# Patient Record
Sex: MALE | Race: WHITE | NOT HISPANIC OR LATINO | Employment: OTHER | URBAN - NONMETROPOLITAN AREA
[De-identification: names, ages, dates, MRNs, and addresses within clinical notes are randomized per-mention and may not be internally consistent; named-entity substitution may affect disease eponyms.]

---

## 2017-01-03 ENCOUNTER — APPOINTMENT (OUTPATIENT)
Dept: CARDIAC REHAB | Facility: HOSPITAL | Age: 67
End: 2017-01-03

## 2017-01-04 ENCOUNTER — APPOINTMENT (OUTPATIENT)
Dept: CARDIAC REHAB | Facility: HOSPITAL | Age: 67
End: 2017-01-04

## 2017-01-06 ENCOUNTER — APPOINTMENT (OUTPATIENT)
Dept: CARDIAC REHAB | Facility: HOSPITAL | Age: 67
End: 2017-01-06

## 2017-01-06 ENCOUNTER — TREATMENT (OUTPATIENT)
Dept: CARDIAC REHAB | Facility: HOSPITAL | Age: 67
End: 2017-01-06

## 2017-01-06 VITALS — OXYGEN SATURATION: 98 % | SYSTOLIC BLOOD PRESSURE: 110 MMHG | HEART RATE: 90 BPM | DIASTOLIC BLOOD PRESSURE: 70 MMHG

## 2017-01-06 DIAGNOSIS — Z95.1 POSTSURGICAL AORTOCORONARY BYPASS STATUS: Primary | ICD-10-CM

## 2017-01-06 DIAGNOSIS — I21.4 NSTEMI (NON-ST ELEVATED MYOCARDIAL INFARCTION) (HCC): ICD-10-CM

## 2017-01-06 PROCEDURE — 93798 PHYS/QHP OP CAR RHAB W/ECG: CPT

## 2017-01-06 NOTE — PROGRESS NOTES
Pt attended phase II visit.  Tolerated exercise well, NAD noted, no c/o's voiced, skin w/p/d, resp nl and even, denies chest discomfort,see Romie documentation for exercise data.  Dr Gomez physician immediately available.  V/S WIDL

## 2017-01-09 ENCOUNTER — TREATMENT (OUTPATIENT)
Dept: CARDIAC REHAB | Facility: HOSPITAL | Age: 67
End: 2017-01-09

## 2017-01-09 ENCOUNTER — APPOINTMENT (OUTPATIENT)
Dept: CARDIAC REHAB | Facility: HOSPITAL | Age: 67
End: 2017-01-09

## 2017-01-09 VITALS — SYSTOLIC BLOOD PRESSURE: 128 MMHG | DIASTOLIC BLOOD PRESSURE: 80 MMHG | HEART RATE: 94 BPM

## 2017-01-09 DIAGNOSIS — Z95.1 POSTSURGICAL AORTOCORONARY BYPASS STATUS: Primary | ICD-10-CM

## 2017-01-09 DIAGNOSIS — I21.4 NSTEMI (NON-ST ELEVATED MYOCARDIAL INFARCTION) (HCC): ICD-10-CM

## 2017-01-09 PROCEDURE — 93798 PHYS/QHP OP CAR RHAB W/ECG: CPT

## 2017-01-11 ENCOUNTER — TREATMENT (OUTPATIENT)
Dept: CARDIAC REHAB | Facility: HOSPITAL | Age: 67
End: 2017-01-11

## 2017-01-11 ENCOUNTER — APPOINTMENT (OUTPATIENT)
Dept: CARDIAC REHAB | Facility: HOSPITAL | Age: 67
End: 2017-01-11

## 2017-01-11 VITALS — DIASTOLIC BLOOD PRESSURE: 78 MMHG | HEART RATE: 92 BPM | SYSTOLIC BLOOD PRESSURE: 104 MMHG

## 2017-01-11 DIAGNOSIS — I21.4 NSTEMI (NON-ST ELEVATED MYOCARDIAL INFARCTION) (HCC): Primary | ICD-10-CM

## 2017-01-11 DIAGNOSIS — Z95.1 POSTSURGICAL AORTOCORONARY BYPASS STATUS: ICD-10-CM

## 2017-01-11 PROCEDURE — 93798 PHYS/QHP OP CAR RHAB W/ECG: CPT

## 2017-01-13 ENCOUNTER — TREATMENT (OUTPATIENT)
Dept: CARDIAC REHAB | Facility: HOSPITAL | Age: 67
End: 2017-01-13

## 2017-01-13 ENCOUNTER — APPOINTMENT (OUTPATIENT)
Dept: CARDIAC REHAB | Facility: HOSPITAL | Age: 67
End: 2017-01-13

## 2017-01-13 VITALS — SYSTOLIC BLOOD PRESSURE: 120 MMHG | DIASTOLIC BLOOD PRESSURE: 74 MMHG | HEART RATE: 96 BPM

## 2017-01-13 DIAGNOSIS — I21.4 NSTEMI (NON-ST ELEVATED MYOCARDIAL INFARCTION) (HCC): ICD-10-CM

## 2017-01-13 DIAGNOSIS — Z95.1 POSTSURGICAL AORTOCORONARY BYPASS STATUS: Primary | ICD-10-CM

## 2017-01-13 PROCEDURE — 93798 PHYS/QHP OP CAR RHAB W/ECG: CPT

## 2017-01-13 NOTE — PROGRESS NOTES
Pt attended phase II visit.  Tolerated exercise well, NAD noted, no c/o's voiced, skin w/p/d, resp nl and even, denies chest discomfort,see Romie documentation for exercise data.  Dr Read physician immediately available.  V/S WIDL

## 2017-01-16 ENCOUNTER — APPOINTMENT (OUTPATIENT)
Dept: CARDIAC REHAB | Facility: HOSPITAL | Age: 67
End: 2017-01-16

## 2017-01-16 ENCOUNTER — TREATMENT (OUTPATIENT)
Dept: CARDIAC REHAB | Facility: HOSPITAL | Age: 67
End: 2017-01-16

## 2017-01-16 VITALS — DIASTOLIC BLOOD PRESSURE: 86 MMHG | HEART RATE: 86 BPM | SYSTOLIC BLOOD PRESSURE: 124 MMHG

## 2017-01-16 DIAGNOSIS — I21.4 NSTEMI (NON-ST ELEVATED MYOCARDIAL INFARCTION) (HCC): Primary | ICD-10-CM

## 2017-01-16 DIAGNOSIS — Z95.1 POSTSURGICAL AORTOCORONARY BYPASS STATUS: ICD-10-CM

## 2017-01-16 PROCEDURE — 93798 PHYS/QHP OP CAR RHAB W/ECG: CPT

## 2017-01-18 ENCOUNTER — TREATMENT (OUTPATIENT)
Dept: CARDIAC REHAB | Facility: HOSPITAL | Age: 67
End: 2017-01-18

## 2017-01-18 ENCOUNTER — APPOINTMENT (OUTPATIENT)
Dept: CARDIAC REHAB | Facility: HOSPITAL | Age: 67
End: 2017-01-18

## 2017-01-18 VITALS — SYSTOLIC BLOOD PRESSURE: 130 MMHG | OXYGEN SATURATION: 98 % | HEART RATE: 79 BPM | DIASTOLIC BLOOD PRESSURE: 84 MMHG

## 2017-01-18 DIAGNOSIS — I21.4 NSTEMI (NON-ST ELEVATED MYOCARDIAL INFARCTION) (HCC): Primary | ICD-10-CM

## 2017-01-18 DIAGNOSIS — Z95.1 POSTSURGICAL AORTOCORONARY BYPASS STATUS: ICD-10-CM

## 2017-01-18 PROCEDURE — 93798 PHYS/QHP OP CAR RHAB W/ECG: CPT

## 2017-01-20 ENCOUNTER — APPOINTMENT (OUTPATIENT)
Dept: CARDIAC REHAB | Facility: HOSPITAL | Age: 67
End: 2017-01-20

## 2017-01-23 ENCOUNTER — TREATMENT (OUTPATIENT)
Dept: CARDIAC REHAB | Facility: HOSPITAL | Age: 67
End: 2017-01-23

## 2017-01-23 ENCOUNTER — APPOINTMENT (OUTPATIENT)
Dept: CARDIAC REHAB | Facility: HOSPITAL | Age: 67
End: 2017-01-23

## 2017-01-23 VITALS — SYSTOLIC BLOOD PRESSURE: 126 MMHG | DIASTOLIC BLOOD PRESSURE: 86 MMHG | HEART RATE: 80 BPM

## 2017-01-23 DIAGNOSIS — I21.4 NSTEMI (NON-ST ELEVATED MYOCARDIAL INFARCTION) (HCC): Primary | ICD-10-CM

## 2017-01-23 DIAGNOSIS — Z95.1 POSTSURGICAL AORTOCORONARY BYPASS STATUS: ICD-10-CM

## 2017-01-23 PROCEDURE — 93798 PHYS/QHP OP CAR RHAB W/ECG: CPT

## 2017-01-25 ENCOUNTER — TREATMENT (OUTPATIENT)
Dept: CARDIAC REHAB | Facility: HOSPITAL | Age: 67
End: 2017-01-25

## 2017-01-25 ENCOUNTER — APPOINTMENT (OUTPATIENT)
Dept: CARDIAC REHAB | Facility: HOSPITAL | Age: 67
End: 2017-01-25

## 2017-01-25 VITALS — SYSTOLIC BLOOD PRESSURE: 128 MMHG | DIASTOLIC BLOOD PRESSURE: 80 MMHG | HEART RATE: 76 BPM | OXYGEN SATURATION: 98 %

## 2017-01-25 DIAGNOSIS — I21.4 NSTEMI (NON-ST ELEVATED MYOCARDIAL INFARCTION) (HCC): Primary | ICD-10-CM

## 2017-01-25 DIAGNOSIS — Z95.1 POSTSURGICAL AORTOCORONARY BYPASS STATUS: ICD-10-CM

## 2017-01-25 PROCEDURE — 93798 PHYS/QHP OP CAR RHAB W/ECG: CPT

## 2017-01-27 ENCOUNTER — APPOINTMENT (OUTPATIENT)
Dept: CARDIAC REHAB | Facility: HOSPITAL | Age: 67
End: 2017-01-27

## 2017-01-30 ENCOUNTER — APPOINTMENT (OUTPATIENT)
Dept: CARDIAC REHAB | Facility: HOSPITAL | Age: 67
End: 2017-01-30

## 2017-02-01 ENCOUNTER — APPOINTMENT (OUTPATIENT)
Dept: CARDIAC REHAB | Facility: HOSPITAL | Age: 67
End: 2017-02-01

## 2017-02-03 ENCOUNTER — APPOINTMENT (OUTPATIENT)
Dept: CARDIAC REHAB | Facility: HOSPITAL | Age: 67
End: 2017-02-03

## 2017-02-06 ENCOUNTER — APPOINTMENT (OUTPATIENT)
Dept: CARDIAC REHAB | Facility: HOSPITAL | Age: 67
End: 2017-02-06

## 2017-02-08 ENCOUNTER — APPOINTMENT (OUTPATIENT)
Dept: CARDIAC REHAB | Facility: HOSPITAL | Age: 67
End: 2017-02-08

## 2017-02-10 ENCOUNTER — APPOINTMENT (OUTPATIENT)
Dept: CARDIAC REHAB | Facility: HOSPITAL | Age: 67
End: 2017-02-10

## 2017-02-13 ENCOUNTER — APPOINTMENT (OUTPATIENT)
Dept: CARDIAC REHAB | Facility: HOSPITAL | Age: 67
End: 2017-02-13

## 2017-02-15 ENCOUNTER — APPOINTMENT (OUTPATIENT)
Dept: CARDIAC REHAB | Facility: HOSPITAL | Age: 67
End: 2017-02-15

## 2017-02-17 ENCOUNTER — APPOINTMENT (OUTPATIENT)
Dept: CARDIAC REHAB | Facility: HOSPITAL | Age: 67
End: 2017-02-17

## 2017-02-20 ENCOUNTER — APPOINTMENT (OUTPATIENT)
Dept: CARDIAC REHAB | Facility: HOSPITAL | Age: 67
End: 2017-02-20

## 2017-02-23 ENCOUNTER — OFFICE VISIT (OUTPATIENT)
Dept: CARDIOLOGY | Facility: CLINIC | Age: 67
End: 2017-02-23

## 2017-02-23 VITALS
SYSTOLIC BLOOD PRESSURE: 123 MMHG | OXYGEN SATURATION: 95 % | BODY MASS INDEX: 28.45 KG/M2 | DIASTOLIC BLOOD PRESSURE: 78 MMHG | HEART RATE: 93 BPM | HEIGHT: 71 IN | WEIGHT: 203.2 LBS

## 2017-02-23 DIAGNOSIS — E78.5 HYPERLIPIDEMIA LDL GOAL <70: ICD-10-CM

## 2017-02-23 DIAGNOSIS — Z72.0 TOBACCO ABUSE: ICD-10-CM

## 2017-02-23 DIAGNOSIS — I48.0 PAROXYSMAL ATRIAL FIBRILLATION (HCC): ICD-10-CM

## 2017-02-23 DIAGNOSIS — I25.119 CORONARY ARTERY DISEASE INVOLVING NATIVE CORONARY ARTERY OF NATIVE HEART WITH ANGINA PECTORIS (HCC): Primary | ICD-10-CM

## 2017-02-23 DIAGNOSIS — G45.8 OTHER SPECIFIED TRANSIENT CEREBRAL ISCHEMIAS: ICD-10-CM

## 2017-02-23 PROCEDURE — 99213 OFFICE O/P EST LOW 20 MIN: CPT | Performed by: INTERNAL MEDICINE

## 2017-02-23 NOTE — PROGRESS NOTES
Subjective   NAME:    Jose Nesbitt   :      1950  DATE:    2017    Jose Nesbitt is a 66-year-old  male who has had bypass surgery.  He is here for 3 month follow-up appointment.    He denies any chest discomfort, shortness of breath, lightheadedness or dizziness.  He has decreased his medication intake to the following.  Aspirin 81 mg daily, atorvastatin 20 mg daily, Plavix 75 mg daily, metoprolol 25 mg twice a day.    He admits to continuing to smoke a pack of cigarettes a day.  However he states that he is ready to stop.  He has patches at home which she intends to use for this effort.    REASON FOR VISIT:  Chief Complaint   Patient presents with   • Follow-up   • Atrial Fibrillation       HISTORY:  PAST MEDICAL HISTORY:   Past Medical History   Diagnosis Date   • Coronary artery disease    • Hyperlipidemia    • Hypertension    • Neck discomfort      RELATED TO FALL       SURGICAL HISTORY:   Past Surgical History   Procedure Laterality Date   • Cyst removal       FROM CHEST, APPROX 20 YEARS AGO   • Hernia repair       APPROX 30 YEARS AGO   • Cardiac catheterization N/A 10/11/2016     Procedure: Left Heart Cath;  Surgeon: Travon Ramey DO;  Location: [facility] CATH INVASIVE LOCATION;  Service:    • Coronary artery bypass graft N/A 10/12/2016     Procedure: ROSALINDA PER ANESTHESIA, MEDIAN STERNOTOMY, CORONARY ARTERY BYPASS GRAFT X  3, UTILIZING THE LEFT INTERNAL MAMMARY ARTERY AND EVH OF RIGHT GREATER SAPHENOUS VEIN;  Surgeon: Joe Hogue MD;  Location: [facility] OR;  Service:        SOCIAL HISTORY:   Social History     Social History   • Marital status:      Spouse name: N/A   • Number of children: 2   • Years of education: N/A     Occupational History   • [employer] company employee      Social History Main Topics   • Smoking status: Current Every Day Smoker     Packs/day: 1.00     Years: 40.00     Types: Cigarettes     Last attempt to quit:    • Smokeless tobacco: Never  Used      Comment: Currently 3-4 Cigarettes Daily   • Alcohol use 1.2 oz/week     2 Cans of beer per week      Comment: occasional   • Drug use: No   • Sexual activity: Defer     Other Topics Concern   • None     Social History Narrative        Lives with a girlfriend    Works as a     Drinks alcohol occasionally    Smokes approximately one pack cigarettes per day.  Has recently stopped smoking.    Initially used Chantix but unable to continue due to his work    Drinks 1 -2 servings of caffeine per day.           FAMILY HISTORY:   Family History   Problem Relation Age of Onset   • Diabetes Mother    • Pneumonia Father    • Heart attack Sister 67       REVIEW OF SYSTEMS:  Review of Systems   Constitutional: Negative for activity change, appetite change and fatigue.   HENT: Positive for tinnitus. Negative for congestion.    Eyes: Negative for visual disturbance.   Respiratory: Negative for cough, chest tightness and shortness of breath.    Cardiovascular: Negative for chest pain, palpitations and leg swelling.   Gastrointestinal: Negative for blood in stool, nausea and vomiting.   Endocrine: Negative for cold intolerance, heat intolerance and polyuria.   Genitourinary: Negative for dysuria.   Musculoskeletal: Positive for myalgias and neck pain.   Skin: Negative for rash.   Neurological: Positive for weakness. Negative for dizziness, syncope and light-headedness.   Hematological: Bruises/bleeds easily.   Psychiatric/Behavioral: Negative for confusion and sleep disturbance.       Objective       PHYSICAL EXAMINATION:  Physical Exam   Constitutional: He is oriented to person, place, and time. He appears well-developed and well-nourished.   HENT:   Head: Normocephalic and atraumatic.   Eyes: Conjunctivae and EOM are normal. Pupils are equal, round, and reactive to light.   Neck: Normal range of motion. Neck supple. No JVD present. No tracheal deviation present. No thyromegaly present.   Cardiovascular:  "Normal rate, regular rhythm, normal heart sounds and intact distal pulses.  Exam reveals no gallop and no friction rub.    No murmur heard.  Pulmonary/Chest: Effort normal and breath sounds normal. No respiratory distress. He has no wheezes. He has no rales. He exhibits no tenderness.   Abdominal: Soft. Bowel sounds are normal. He exhibits no distension and no mass. There is no tenderness. No hernia.   Musculoskeletal: Normal range of motion. He exhibits no edema, tenderness or deformity.   Lymphadenopathy:     He has no cervical adenopathy.   Neurological: He is alert and oriented to person, place, and time. He has normal reflexes. He displays normal reflexes. No cranial nerve deficit. He exhibits normal muscle tone. Coordination normal.   Skin: Skin is warm and dry.   Psychiatric: He has a normal mood and affect. His behavior is normal. Judgment and thought content normal.       VITAL SIGNS:   Visit Vitals   • /78 (BP Location: Right arm, Patient Position: Sitting)   • Pulse 93   • Ht 71\" (180.3 cm)   • Wt 203 lb 3.2 oz (92.2 kg)   • SpO2 95%   • BMI 28.34 kg/m2       Procedure   Procedures         Assessment/Plan     Problems Addressed this Visit        Cardiovascular and Mediastinum    Coronary artery disease involving native coronary artery of native heart with angina pectoris - Primary    H/O TIA (transient ischemic attack) (in 2002)    Hyperlipidemia LDL goal <70    Paroxysmal atrial fibrillation       Other    Tobacco abuse       1.  Continue medications as currently being taken.  2.  Counseled again on tobacco cessation.  3.  We'll see him back in clinic in 6 months.         Return in about 6 months (around 8/23/2017).    Current Outpatient Prescriptions:   •  Acetaminophen (TYLENOL PO), Take  by mouth As Needed., Disp: , Rfl:   •  aspirin 81 MG EC tablet, Take 81 mg by mouth Daily., Disp: , Rfl:   •  atorvastatin (LIPITOR) 40 MG tablet, Take 1 tablet by mouth Every Night. (Patient taking differently: " Take 20 mg by mouth Every Night.), Disp: 30 tablet, Rfl: 11  •  clopidogrel (PLAVIX) 75 MG tablet, Take 1 tablet by mouth Daily., Disp: 30 tablet, Rfl: 12  •  esomeprazole (NexIUM) 40 MG capsule, Take 40 mg by mouth Every Morning Before Breakfast., Disp: , Rfl:   •  indomethacin (INDOCIN) 25 MG capsule, Take 1 capsule by mouth 3 (Three) Times a Day As Needed for mild pain (1-3)., Disp: 90 capsule, Rfl: 2  •  metoprolol tartrate (LOPRESSOR) 25 MG tablet, Take 0.5 tablets by mouth 2 (Two) Times a Day. (Patient taking differently: Take 12.5 mg by mouth 2 (Two) Times a Day. 1/2 tablet Daily), Disp: 30 tablet, Rfl: 3  •  tamsulosin (FLOMAX) 0.4 MG capsule 24 hr capsule, Take 1 capsule by mouth Every Night., Disp: , Rfl:                  Travon Ramey DO, KC, FACC, FACOI, FCCP

## 2017-03-31 ENCOUNTER — TELEPHONE (OUTPATIENT)
Dept: CARDIOLOGY | Facility: CLINIC | Age: 67
End: 2017-03-31

## 2017-03-31 NOTE — TELEPHONE ENCOUNTER
PT CALLED REQUESTING WE ORDER A STRESS TEST ON HIM, STATED HE NEEDED ONE IN ORDER TO BE CLEARED TO GO BACK TO WORK.     I DIDN'T SEE ANYTHING IN YOUR NOTE ABOUT ONE.     A

## 2017-04-05 ENCOUNTER — TRANSCRIBE ORDERS (OUTPATIENT)
Dept: ADMINISTRATIVE | Facility: HOSPITAL | Age: 67
End: 2017-04-05

## 2017-04-05 DIAGNOSIS — R06.02 SHORTNESS OF BREATH: Primary | ICD-10-CM

## 2017-04-06 ENCOUNTER — HOSPITAL ENCOUNTER (OUTPATIENT)
Dept: NUCLEAR MEDICINE | Facility: HOSPITAL | Age: 67
Discharge: HOME | End: 2017-04-06
Attending: INTERNAL MEDICINE

## 2017-04-06 ENCOUNTER — HOSPITAL ENCOUNTER (OUTPATIENT)
Dept: CARDIOLOGY | Facility: HOSPITAL | Age: 67
Discharge: HOME | End: 2017-04-06
Attending: INTERNAL MEDICINE

## 2017-04-06 VITALS — BODY MASS INDEX: 29.12 KG/M2 | HEIGHT: 71 IN | WEIGHT: 208 LBS

## 2017-04-06 DIAGNOSIS — R06.02 SHORTNESS OF BREATH: ICD-10-CM

## 2017-04-06 PROCEDURE — 93018 CV STRESS TEST I&R ONLY: CPT | Performed by: INTERNAL MEDICINE

## 2017-04-06 PROCEDURE — A9500 TC99M SESTAMIBI: HCPCS | Performed by: INTERNAL MEDICINE

## 2017-04-06 PROCEDURE — 0 TECHNETIUM SESTAMIBI: Performed by: INTERNAL MEDICINE

## 2017-04-06 PROCEDURE — 78451 HT MUSCLE IMAGE SPECT SING: CPT | Performed by: INTERNAL MEDICINE

## 2017-04-06 PROCEDURE — 93017 CV STRESS TEST TRACING ONLY: CPT

## 2017-04-06 PROCEDURE — 78451 HT MUSCLE IMAGE SPECT SING: CPT

## 2017-04-06 RX ADMIN — Medication 1 DOSE: at 08:43

## 2017-04-06 RX ADMIN — Medication 1 DOSE: at 07:20

## 2017-04-07 LAB
BH CV NUCLEAR PRIOR STUDY: 3
BH CV STRESS BP STAGE 1: NORMAL
BH CV STRESS BP STAGE 2: NORMAL
BH CV STRESS BP STAGE 3: NORMAL
BH CV STRESS DURATION MIN STAGE 1: 3
BH CV STRESS DURATION MIN STAGE 2: 3
BH CV STRESS DURATION MIN STAGE 3: 2
BH CV STRESS DURATION SEC STAGE 1: 0
BH CV STRESS DURATION SEC STAGE 2: 0
BH CV STRESS DURATION SEC STAGE 3: 32
BH CV STRESS GRADE STAGE 1: 10
BH CV STRESS GRADE STAGE 2: 12
BH CV STRESS GRADE STAGE 3: 14
BH CV STRESS HR STAGE 1: 96
BH CV STRESS HR STAGE 2: 111
BH CV STRESS HR STAGE 3: 133
BH CV STRESS METS STAGE 1: 5
BH CV STRESS METS STAGE 2: 7.5
BH CV STRESS METS STAGE 3: 10
BH CV STRESS PROTOCOL 1: NORMAL
BH CV STRESS RECOVERY BP: NORMAL MMHG
BH CV STRESS RECOVERY HR: 91 BPM
BH CV STRESS SPEED STAGE 1: 1.7
BH CV STRESS SPEED STAGE 2: 2.5
BH CV STRESS SPEED STAGE 3: 3.4
BH CV STRESS STAGE 1: 1
BH CV STRESS STAGE 2: 2
BH CV STRESS STAGE 3: 3
LV EF NUC BP: 67 %
MAXIMAL PREDICTED HEART RATE: 156 BPM
PERCENT MAX PREDICTED HR: 85.26 %
STRESS BASELINE BP: NORMAL MMHG
STRESS BASELINE HR: 74 BPM
STRESS PERCENT HR: 100 %
STRESS POST ESTIMATED WORKLOAD: 10.1 METS
STRESS POST EXERCISE DUR MIN: 8 MIN
STRESS POST EXERCISE DUR SEC: 32 SEC
STRESS POST PEAK BP: NORMAL MMHG
STRESS POST PEAK HR: 133 BPM
STRESS TARGET HR: 133 BPM

## 2017-10-28 ENCOUNTER — HOSPITAL ENCOUNTER (EMERGENCY)
Facility: HOSPITAL | Age: 67
Discharge: HOME | End: 2017-10-28
Attending: EMERGENCY MEDICINE | Admitting: EMERGENCY MEDICINE

## 2017-10-28 VITALS
SYSTOLIC BLOOD PRESSURE: 120 MMHG | TEMPERATURE: 98.2 F | HEART RATE: 82 BPM | OXYGEN SATURATION: 97 % | DIASTOLIC BLOOD PRESSURE: 74 MMHG | HEIGHT: 71 IN | RESPIRATION RATE: 18 BRPM | BODY MASS INDEX: 28.84 KG/M2 | WEIGHT: 206 LBS

## 2017-10-28 DIAGNOSIS — S00.81XA ABRASION OF FACE, INITIAL ENCOUNTER: Primary | ICD-10-CM

## 2017-10-28 DIAGNOSIS — R58 BLEEDING: ICD-10-CM

## 2017-10-28 PROCEDURE — 99283 EMERGENCY DEPT VISIT LOW MDM: CPT

## 2017-10-28 RX ADMIN — SILVER NITRATE APPLICATORS 1 APPLICATION: 25; 75 STICK TOPICAL at 01:42

## 2017-11-22 ENCOUNTER — TELEPHONE (OUTPATIENT)
Dept: CARDIOLOGY | Facility: CLINIC | Age: 67
End: 2017-11-22

## 2017-11-22 NOTE — TELEPHONE ENCOUNTER
WE RECEIVED REQUEST FOR MEDS FROM PHARMACY HE NEEDED TO BE SEEN IN OFFICE AROUND AUGUST     LEFT MESSAGE FOR PATIENT TO CALL BACK [facility]

## 2017-11-22 NOTE — TELEPHONE ENCOUNTER
SPOKE WITH PATIENT'S WIFE (AND PATIENT WAS IN BACKGROUND) I TOLD THEM THAT WE RECEIVED A REFILL REQUEST FROM PHARMACY WANTING TO HAVE HIS ATORVASTATIN REFILLED. I TOLD HER THAT SINCE KVNG HAD NOT BEEN SEEN IN OUR OFFICE IN SO LONG THAT WE WILL NEED TO GET HIM AN APPOINTMENT. PATIENT STATES THAT HE WOULD RATHER SEE DR. BECKWITH AND HAVE DR. BECKWITH PRESCRIBE THIS MEDICATION AND TO SEE IF HE REALLY NEEDS A CARDIOLOGIST. PATIENTS WIFE STATES THAT THEY WILL CALL US BACK AFTER HIS APPT WITH HIS PCP IF HE REALLY NEEDS AN APPT OR NOT.     I WILL INFORM PHARMACY TO SEND THIS TO HIS PCP TO GET REFILLS.

## 2017-12-19 ENCOUNTER — TRANSCRIBE ORDERS (OUTPATIENT)
Dept: PULMONOLOGY | Facility: HOSPITAL | Age: 67
End: 2017-12-19

## 2017-12-19 ENCOUNTER — HOSPITAL ENCOUNTER (OUTPATIENT)
Dept: RESPIRATORY THERAPY | Facility: HOSPITAL | Age: 67
Discharge: HOME | End: 2017-12-19
Attending: INTERNAL MEDICINE | Admitting: INTERNAL MEDICINE

## 2017-12-19 DIAGNOSIS — R00.2 PALPITATIONS: Primary | ICD-10-CM

## 2017-12-19 DIAGNOSIS — R00.2 PALPITATIONS: ICD-10-CM

## 2017-12-19 PROCEDURE — 93227 XTRNL ECG REC<48 HR R&I: CPT | Performed by: INTERNAL MEDICINE

## 2017-12-19 PROCEDURE — 93225 XTRNL ECG REC<48 HRS REC: CPT

## 2017-12-19 PROCEDURE — 93226 XTRNL ECG REC<48 HR SCAN A/R: CPT

## 2018-01-10 ENCOUNTER — OFFICE VISIT (OUTPATIENT)
Dept: CARDIOLOGY | Facility: CLINIC | Age: 68
End: 2018-01-10

## 2018-01-10 VITALS
DIASTOLIC BLOOD PRESSURE: 82 MMHG | HEIGHT: 71 IN | SYSTOLIC BLOOD PRESSURE: 122 MMHG | BODY MASS INDEX: 28.39 KG/M2 | WEIGHT: 202.8 LBS | HEART RATE: 85 BPM

## 2018-01-10 DIAGNOSIS — I25.119 CORONARY ARTERY DISEASE INVOLVING NATIVE CORONARY ARTERY OF NATIVE HEART WITH ANGINA PECTORIS (HCC): Primary | ICD-10-CM

## 2018-01-10 DIAGNOSIS — Z72.0 TOBACCO ABUSE: ICD-10-CM

## 2018-01-10 DIAGNOSIS — E78.5 HYPERLIPIDEMIA LDL GOAL <70: ICD-10-CM

## 2018-01-10 DIAGNOSIS — R01.1 SYSTOLIC MURMUR: ICD-10-CM

## 2018-01-10 PROBLEM — R07.89 ATYPICAL CHEST PAIN: Status: RESOLVED | Noted: 2018-01-10 | Resolved: 2018-01-10

## 2018-01-10 PROBLEM — R07.89 ATYPICAL CHEST PAIN: Status: ACTIVE | Noted: 2018-01-10

## 2018-01-10 PROBLEM — I49.3 PVC'S (PREMATURE VENTRICULAR CONTRACTIONS): Status: ACTIVE | Noted: 2018-01-10

## 2018-01-10 PROCEDURE — 93000 ELECTROCARDIOGRAM COMPLETE: CPT | Performed by: INTERNAL MEDICINE

## 2018-01-10 PROCEDURE — 99204 OFFICE O/P NEW MOD 45 MIN: CPT | Performed by: INTERNAL MEDICINE

## 2018-01-10 RX ORDER — METOPROLOL SUCCINATE 25 MG/1
25 TABLET, EXTENDED RELEASE ORAL DAILY
COMMUNITY
End: 2018-07-11 | Stop reason: SDUPTHER

## 2018-01-10 RX ORDER — ROSUVASTATIN CALCIUM 20 MG/1
20 TABLET, COATED ORAL DAILY
Qty: 90 TABLET | Refills: 3 | Status: SHIPPED | OUTPATIENT
Start: 2018-01-10 | End: 2018-07-11 | Stop reason: SDUPTHER

## 2018-01-10 RX ORDER — ASPIRIN 81 MG/1
81 TABLET ORAL DAILY
Qty: 90 TABLET | Refills: 3
Start: 2018-01-10 | End: 2018-07-11 | Stop reason: SDUPTHER

## 2018-01-10 NOTE — ASSESSMENT & PLAN NOTE
"We discussed tobacco cessation at today's visit. The patient is presently not \"mentally\" ready for complete tobacco cessation.  "

## 2018-01-10 NOTE — PROGRESS NOTES
"Encounter Date:01/10/2018    Patient ID: Jose Nesbitt is a 67 y.o. male who resides in [place], [place].    CC/Reason for visit:  Coronary Artery Disease (Consult)            Jose Nesbitt is referred to my office today by Padmaja Christine to establish care of his coronary artery disease and cardiac risk factors. The patient is a 67-year-old gentleman who in October 2016 suffered a NSTEMI.  He underwent cardiac catheterization by Dr. Travon Ramey at [facility] and was found to have an ulcerated plaque of the left main and multivessel CAD. He underwent urgent coronary bypass surgery with Dr. Maynor Hogue which was uneventful. LV systolic function remained preserved.    Since his bypass, the patient is been physically active without symptoms of angina or heart failure. He has been compliant with his medications. He previously smoked 3 packs a day, and has reduced his smoking to one pack a day but is not \"ready\" to quit yet. He does not want to take nicotine replacement therapy or Chantix at this time.    In December, the patient underwent a Holter monitor for what was perceived to be abnormal heartbeats on physical examination with his PCP. Monitor was unremarkable with no significant arrhythmia or ectopy burden. The patient denies palpitations.      Review of Systems   Constitution: Negative for weakness and malaise/fatigue.   Eyes: Negative for vision loss in left eye and vision loss in right eye.   Cardiovascular: Positive for leg swelling (right leg). Negative for chest pain, dyspnea on exertion, near-syncope, orthopnea, palpitations, paroxysmal nocturnal dyspnea and syncope.   Respiratory: Positive for shortness of breath.    Musculoskeletal: Negative for myalgias.   Neurological: Negative for brief paralysis, excessive daytime sleepiness, focal weakness, numbness and paresthesias.   All other systems reviewed and are negative.      The patient's past medical, social, family history and ROS reviewed " "in the patient's electronic medical record.    Allergies  Review of patient's allergies indicates no known allergies.    Outpatient Prescriptions Marked as Taking for the 1/10/18 encounter (Office Visit) with Colt Sawyer IV, MD   Medication Sig Dispense Refill   • esomeprazole (NexIUM) 40 MG capsule Take 40 mg by mouth Every Morning Before Breakfast.     • metoprolol succinate XL (TOPROL-XL) 25 MG 24 hr tablet Take 25 mg by mouth Daily.     • [DISCONTINUED] atorvastatin (LIPITOR) 40 MG tablet Take 1 tablet by mouth Every Night. (Patient taking differently: Take 20 mg by mouth Every Night.) 30 tablet 11   • [DISCONTINUED] clopidogrel (PLAVIX) 75 MG tablet Take 1 tablet by mouth Daily. 30 tablet 12         Blood pressure 122/82, pulse 85, height 180.3 cm (71\"), weight 92 kg (202 lb 12.8 oz).  Body mass index is 28.28 kg/(m^2).    Physical Exam   Constitutional: He is oriented to person, place, and time. He appears well-developed and well-nourished.   HENT:   Head: Normocephalic and atraumatic.   Eyes: Pupils are equal, round, and reactive to light. No scleral icterus.   Neck: No JVD present. Carotid bruit is not present. No thyromegaly present.   Cardiovascular: Normal rate and regular rhythm.  Exam reveals no gallop.    Murmur heard.   Harsh midsystolic murmur is present  at the upper right sternal border The intensity increases with respiration.  Pulmonary/Chest: Effort normal and breath sounds normal.   Abdominal: Soft. He exhibits no distension. There is no hepatosplenomegaly.   Musculoskeletal: He exhibits no edema.   Neurological: He is alert and oriented to person, place, and time.   Skin: Skin is warm and dry.   Psychiatric: He has a normal mood and affect. His behavior is normal.       Data Review:     ECG 12 Lead  Date/Time: 1/10/2018 10:52 AM  Performed by: COLT SAWYER IV  Authorized by: COLT SAWYER IV   Rhythm: sinus rhythm  BPM: 85  Conduction: 1st degree  Clinical " impression: abnormal ECG          Lipids (8/15/17): Total cholesterol 179, HDL 30, triglycerides 108,        Problem List Items Addressed This Visit        Cardiovascular and Mediastinum    Coronary artery disease involving native coronary artery of native heart with angina pectoris - Primary    Overview     · Cardiac catheterization for NSTEMI by Travon Ramey (10/11/2016):  Ulcerated critical distal left main disease and multivessel CAD.  · CABG by Dr. Joe Hogue (10/12/2016):  LIMA to LAD, SVG to ramus, SVG to OM1  · Exercise nuclear stress (4/6/17): Exercised for 8.5 minutes. No ischemia/infarct. LVEF 67%.         Current Assessment & Plan     The patient is presently without anginal symptoms. He is now one year out from his non-STEMI and clopidogrel may be discontinued. I like him to take low-dose aspirin on a daily basis and continue beta blocker therapy.         Relevant Medications    metoprolol succinate XL (TOPROL-XL) 25 MG 24 hr tablet    aspirin 81 MG EC tablet    Other Relevant Orders    ECG 12 Lead    Hyperlipidemia LDL goal <70    Overview     · High intensity statin therapy indicated given the presence coronary disease         Current Assessment & Plan     The patient's LDL is presently above goal of less than 70 g/dL. The patient's had intolerance to higher doses of atorvastatin in the past. We will try the patient on Crestor 20 g daily at bedtime and have him repeat CMP and lipids in 6 weeks.         Relevant Medications    rosuvastatin (CRESTOR) 20 MG tablet    Other Relevant Orders    Comprehensive Metabolic Panel    Lipid Panel    Systolic murmur    Current Assessment & Plan     Systolic murmur heard on exam today is consistent with systolic anterior motion of the mitral valve. Per patient's report, this is been noted on previous exams and presently he has no symptoms of obstruction i.e. heart failure, angina, or syncope.            Other    Tobacco abuse    Current Assessment & Plan  "    We discussed tobacco cessation at today's visit. The patient is presently not \"mentally\" ready for complete tobacco cessation.             The patient is physically active without cardiac symptoms of angina or heart failure. He is now one year out from his myocardial infarction and I am recommending he discontinue clopidogrel and continue aspirin monotherapy. His recent lipid profile suggest less than optimal control of his LDL and therefore I am asking the patient to switch from atorvastatin to rosuvastatin. CMP and lipids should be obtained 6 weeks after making this transition.       · Discontinue clopidogrel  · Increase frequency of low-dose aspirin to daily dosing  · Discontinue atorvastatin  · Start rosuvastatin 20 mg daily at bedtime  · CMP and lipids in 6 weeks  Return in about 6 months (around 7/10/2018).      Colt Sawyer IV, MD  1/10/2018     Scribed for Colt Sawyer IV, MD by Emile Quevedo. 1/10/2018  11:45 AM    "

## 2018-01-10 NOTE — ASSESSMENT & PLAN NOTE
Systolic murmur heard on exam today is consistent with systolic anterior motion of the mitral valve. Per patient's report, this is been noted on previous exams and presently he has no symptoms of obstruction i.e. heart failure, angina, or syncope.

## 2018-01-10 NOTE — ASSESSMENT & PLAN NOTE
The patient's LDL is presently above goal of less than 70 g/dL. The patient's had intolerance to higher doses of atorvastatin in the past. We will try the patient on Crestor 20 g daily at bedtime and have him repeat CMP and lipids in 6 weeks.

## 2018-01-10 NOTE — ASSESSMENT & PLAN NOTE
The patient is presently without anginal symptoms. He is now one year out from his non-STEMI and clopidogrel may be discontinued. I like him to take low-dose aspirin on a daily basis and continue beta blocker therapy.

## 2018-03-01 ENCOUNTER — TELEPHONE (OUTPATIENT)
Dept: CARDIOLOGY | Facility: CLINIC | Age: 68
End: 2018-03-01

## 2018-03-01 NOTE — TELEPHONE ENCOUNTER
Attempted to call the patient to review lab results and possibly increase Crestor if he is able to tolerate. Will await return call.

## 2018-04-05 ENCOUNTER — TELEPHONE (OUTPATIENT)
Dept: CARDIOLOGY | Facility: CLINIC | Age: 68
End: 2018-04-05

## 2018-04-05 NOTE — TELEPHONE ENCOUNTER
Patient called c/o shortness of breath with exertion, fatigue and dizziness at times. /85, HR 90 range. He says that his HR has been running in the 90's at rest. Denies leg swelling or chest pain.     Last stress test 4/5/2017, which was normal. Carotid duplex 10/21/2016, normal. CABG 10/12/2016. 12/2017, holter monitor showed no arrhythmias.     Currently on metoprolol succinate 25 mg daily, ASA 81 mg daily, Crestor 20 mg daily.    Wasn't sure if you wanted an echo and/or med changes? Something else?

## 2018-07-11 ENCOUNTER — OFFICE VISIT (OUTPATIENT)
Dept: CARDIOLOGY | Facility: CLINIC | Age: 68
End: 2018-07-11

## 2018-07-11 VITALS
DIASTOLIC BLOOD PRESSURE: 82 MMHG | WEIGHT: 201.2 LBS | BODY MASS INDEX: 28.17 KG/M2 | HEIGHT: 71 IN | SYSTOLIC BLOOD PRESSURE: 122 MMHG | HEART RATE: 77 BPM

## 2018-07-11 DIAGNOSIS — F17.210 NICOTINE DEPENDENCE, CIGARETTES, UNCOMPLICATED: ICD-10-CM

## 2018-07-11 DIAGNOSIS — Z72.0 TOBACCO ABUSE: ICD-10-CM

## 2018-07-11 DIAGNOSIS — E78.5 HYPERLIPIDEMIA LDL GOAL <70: ICD-10-CM

## 2018-07-11 DIAGNOSIS — R01.1 SYSTOLIC MURMUR: ICD-10-CM

## 2018-07-11 DIAGNOSIS — I25.119 CORONARY ARTERY DISEASE INVOLVING NATIVE CORONARY ARTERY OF NATIVE HEART WITH ANGINA PECTORIS (HCC): Primary | ICD-10-CM

## 2018-07-11 DIAGNOSIS — I49.3 PVC'S (PREMATURE VENTRICULAR CONTRACTIONS): ICD-10-CM

## 2018-07-11 DIAGNOSIS — Z12.2 ENCOUNTER FOR SCREENING FOR LUNG CANCER: ICD-10-CM

## 2018-07-11 PROCEDURE — 99214 OFFICE O/P EST MOD 30 MIN: CPT | Performed by: INTERNAL MEDICINE

## 2018-07-11 RX ORDER — ASPIRIN 81 MG/1
81 TABLET ORAL DAILY
Qty: 90 TABLET | Refills: 3
Start: 2018-07-11 | End: 2019-07-06

## 2018-07-11 RX ORDER — DIAZEPAM 5 MG/1
5 TABLET ORAL AS NEEDED
COMMUNITY
End: 2021-08-06

## 2018-07-11 RX ORDER — ROSUVASTATIN CALCIUM 20 MG/1
20 TABLET, COATED ORAL DAILY
Qty: 90 TABLET | Refills: 3 | Status: SHIPPED | OUTPATIENT
Start: 2018-07-11 | End: 2019-07-19

## 2018-07-11 RX ORDER — METOPROLOL SUCCINATE 25 MG/1
25 TABLET, EXTENDED RELEASE ORAL DAILY
Qty: 90 TABLET | Refills: 3 | Status: SHIPPED | OUTPATIENT
Start: 2018-07-11 | End: 2019-07-06

## 2018-07-11 NOTE — ASSESSMENT & PLAN NOTE
· Stable CCS class II symptoms  · Continue aspirin, beta blocker, and high intensity statin therapy

## 2018-07-11 NOTE — ASSESSMENT & PLAN NOTE
· Stable murmur consistent with known systolic anterior motion of the mitral valve  · No symptoms of heart failure

## 2018-07-11 NOTE — PROGRESS NOTES
Encounter Date:07/11/2018    Patient ID: Jose Nesbitt is a 67 y.o. male who resides in [place], [place].    CC/Reason for visit:  Coronary Artery Disease and PAF            Jose Nesbitt returns to my office today in follow up of his coronary artery disease, palpitations, tobacco abuse, and hyperlipidemia. Since last visit, patient has been feeling well overall from a cardiovascular standpoint. He notes one episode of palpitations. He states that it fluttered for approximately 30 seconds then subsided. This episode occurred after he was working in the hot sun. He states that he has been building a deck this summer and did so with no limitations.    Review of Systems   Constitution: Negative for weakness and malaise/fatigue.   Eyes: Negative for vision loss in left eye and vision loss in right eye.   Cardiovascular: Positive for irregular heartbeat and leg swelling. Negative for chest pain, dyspnea on exertion, near-syncope, orthopnea, palpitations, paroxysmal nocturnal dyspnea and syncope.   Respiratory: Positive for shortness of breath and snoring.    Skin: Positive for skin cancer.   Musculoskeletal: Positive for arthritis, joint pain, joint swelling, muscle cramps and myalgias.   Neurological: Negative for brief paralysis, excessive daytime sleepiness, focal weakness, numbness and paresthesias.   All other systems reviewed and are negative.      The patient's past medical, social, family history and ROS reviewed in the patient's electronic medical record.    Allergies  Patient has no known allergies.    Outpatient Prescriptions Marked as Taking for the 7/11/18 encounter (Office Visit) with Colt Sawyer IV, MD   Medication Sig Dispense Refill   • aspirin 81 MG EC tablet Take 1 tablet by mouth Daily for 360 days. Takes every other day 90 tablet 3   • diazePAM (VALIUM) 5 MG tablet Take 5 mg by mouth As Needed for Anxiety.     • esomeprazole (NexIUM) 40 MG capsule Take 40 mg by mouth As Needed.     •  "metoprolol succinate XL (TOPROL-XL) 25 MG 24 hr tablet Take 1 tablet by mouth Daily for 360 days. 90 tablet 3   • rosuvastatin (CRESTOR) 20 MG tablet Take 1 tablet by mouth Daily. 90 tablet 3   • [DISCONTINUED] aspirin 81 MG EC tablet Take 1 tablet by mouth Daily for 360 days. Takes every other day 90 tablet 3   • [DISCONTINUED] metoprolol succinate XL (TOPROL-XL) 25 MG 24 hr tablet Take 25 mg by mouth Daily.     • [DISCONTINUED] rosuvastatin (CRESTOR) 20 MG tablet Take 1 tablet by mouth Daily. 90 tablet 3         Blood pressure 122/82, pulse 77, height 180.3 cm (71\"), weight 91.3 kg (201 lb 3.2 oz).  Body mass index is 28.06 kg/m².  There were no vitals filed for this visit.    Physical Exam   Constitutional: He is oriented to person, place, and time. He appears well-developed and well-nourished.   HENT:   Head: Normocephalic and atraumatic.   Eyes: Pupils are equal, round, and reactive to light. No scleral icterus.   Neck: No JVD present. Carotid bruit is not present. No thyromegaly present.   Cardiovascular: Normal rate, regular rhythm, S1 normal and S2 normal.  Exam reveals no gallop.    Murmur heard.  High-pitched blowing holosystolic murmur is present with a grade of 2/6  at the apex  Pulmonary/Chest: Effort normal and breath sounds normal.   Abdominal: Soft. There is no hepatosplenomegaly. There is no tenderness.   Neurological: He is alert and oriented to person, place, and time.   Skin: Skin is warm and dry. No cyanosis. Nails show no clubbing.   Psychiatric: He has a normal mood and affect. His behavior is normal.       Data Review:     Procedures    Labs (2/22/2018):  · , Tri 144, HDL 47, LDL 95  · Glucose 102, BUN 20, Creatinine 0.93, Sodium 137, Potassium 4.5, AST 19, ALT 21  · Hgb A1c 6.0  ·        Problem List Items Addressed This Visit        Cardiovascular and Mediastinum    Coronary artery disease involving native coronary artery of native heart with angina pectoris (CMS/HCC) - Primary    " Overview     · Cardiac catheterization for NSTEMI by Travon Ramey (10/11/2016):  Ulcerated critical distal left main disease and multivessel CAD.  · CABG by Dr. Joe Hogue (10/12/2016):  LIMA to LAD, SVG to ramus, SVG to OM1  · Exercise nuclear stress (4/6/17): Exercised for 8.5 minutes. No ischemia/infarct. LVEF 67%.         Current Assessment & Plan     · Stable CCS class II symptoms  · Continue aspirin, beta blocker, and high intensity statin therapy         Relevant Medications    metoprolol succinate XL (TOPROL-XL) 25 MG 24 hr tablet    aspirin 81 MG EC tablet    Hyperlipidemia LDL goal <70    Overview     · High intensity statin therapy indicated given the presence coronary disease         Current Assessment & Plan     · Continue Crestor         Relevant Medications    rosuvastatin (CRESTOR) 20 MG tablet    PVC's (premature ventricular contractions)    Overview     · 48 hour Holter monitor (12/22/2017): Occasional PVCs and PACs.  No arrhythmias or pauses         Relevant Medications    metoprolol succinate XL (TOPROL-XL) 25 MG 24 hr tablet    Systolic murmur    Current Assessment & Plan     · Stable murmur consistent with known systolic anterior motion of the mitral valve  · No symptoms of heart failure            Other    Tobacco abuse    Current Assessment & Plan     · Patient would like to quit smoking  · Declines Chantix and nicotine patches  · We'll try nicotine lozenges  · CT chest low dose for lung cancer screening           Other Visit Diagnoses     Encounter for screening for lung cancer        Relevant Orders    CT chest low dose wo    Nicotine dependence, cigarettes, uncomplicated        Relevant Orders    CT chest low dose wo               · Continue present medical therapy  · Tobacco cessation recommended  · CT chest for lung CA screening  · Return in 1 year (on 7/11/2019).  ·     Colt Sawyer IV, MD  7/11/2018     Scribed for Colt Sawyer IV, MD by Emile Quevedo.  "7/11/2018  10:05 AM           Low-Dose Lung Cancer CT Screening Visit    CHIEF COMPLAINT:    Shared Decision Making  I am discussing tobacco cessation today with Jose Nesbitt    SMOKING HISTORY:     History   Smoking Status   • Current Every Day Smoker   • Packs/day: 1.00   • Years: 50.00   • Types: Cigarettes   Smokeless Tobacco   • Never Used       SUBJECTIVE:     Jose Nesbitt is currently smoking 1 pack(s) per day with a  50 pack year history.  Reports no use of alternate forms of tobacco, electronic cigarettes, marijuana or other substances.  Based on the recommendation of the [place] Preventive Services Task Force, this patient is at high risk for lung cancer and a low-dose CT screening scan is recommended.     The patient has had no hemoptysis, unintentional weight loss or increasing shortness of breath. The patient is asymptomatic and has no signs or symptoms of lung cancer.     Together we discussed the potential benefits and potential harms of being screened for lung cancer including the potential for follow up diagnostic testing, risk for over diagnosis, false positive rate and radiation exposure using the AHRQ: Is Lung Cancer Screening Right for Me Decision Aid (Publication [id number], web site www.ahrq.gov). A copy of this decision aid resource has been provided in the after visit summary.  We also reviewed the patient's smoking history and counseled him on the importance and health benefits of stopping the use of tobacco products.      OBJECTIVE:    /82 (BP Location: Right arm, Patient Position: Sitting)   Pulse 77   Ht 180.3 cm (71\")   Wt 91.3 kg (201 lb 3.2 oz)   BMI 28.06 kg/m²   General: no distress, alert and oriented  Chest: Lung sounds are clear to auscultation, no wheezes, rales or rhonchi, with symmetric air entry. No respiratory distress  Cardiovascular: RRR with no murmur auscultated      Smoking Cessation discussion:     We discussed that there are a number of " resources and interventions to assist with smoking cessation if needed in the future including the 1-800-Quit Now line.(Included in the decision aid shared with the patient today).   On a scale of zero to ten, the patient rates their motivation to quit at a 7 out of 10 today.  Referral to stop smoking class has been offered. Medication options for tobacco cessation have been discussed with the patient.     Recommendations for continued lung cancer screening:      We discussed the NCCN guidelines for lung cancer screening and the patient verbalized understanding that annual screening is recommended until fifteen years beyond smoking as long as they have no other disease or comorbidity that would prevent them from receiving cancer treatments such as surgery should a lung cancer be detected.  After review of the NCCN guidelines and recommendations for ongoing screening, the patient verbalized understanding of recommendations for follow-up.  The patient has decided to proceed with a Low Dose Lung Cancer Screening CT today.      10 minutes face-to-face spent counseling patient on the continued health benefits of having quit tobacco, maintaining smoking abstinence, smoking cessation strategies and resources, as well as the importance of adherence to annual lung cancer low-dose CT screening.    Colt Sawyer IV, MD  7/11/2018

## 2018-07-11 NOTE — ASSESSMENT & PLAN NOTE
· Patient would like to quit smoking  · Declines Chantix and nicotine patches  · We'll try nicotine lozenges  · CT chest low dose for lung cancer screening

## 2018-07-25 ENCOUNTER — HOSPITAL ENCOUNTER (OUTPATIENT)
Dept: CT IMAGING | Facility: HOSPITAL | Age: 68
Discharge: HOME | End: 2018-07-25
Attending: INTERNAL MEDICINE | Admitting: INTERNAL MEDICINE

## 2018-07-25 DIAGNOSIS — Z12.2 ENCOUNTER FOR SCREENING FOR LUNG CANCER: ICD-10-CM

## 2018-07-25 DIAGNOSIS — F17.210 NICOTINE DEPENDENCE, CIGARETTES, UNCOMPLICATED: ICD-10-CM

## 2018-07-25 PROCEDURE — G0297 LDCT FOR LUNG CA SCREEN: HCPCS

## 2018-08-03 ENCOUNTER — TRANSCRIBE ORDERS (OUTPATIENT)
Dept: ADMINISTRATIVE | Facility: HOSPITAL | Age: 68
End: 2018-08-03

## 2018-08-03 DIAGNOSIS — Z13.6 SCREENING FOR ABDOMINAL AORTIC ANEURYSM: Primary | ICD-10-CM

## 2018-08-10 ENCOUNTER — HOSPITAL ENCOUNTER (OUTPATIENT)
Dept: ULTRASOUND IMAGING | Facility: HOSPITAL | Age: 68
Discharge: HOME | End: 2018-08-10
Admitting: INTERNAL MEDICINE

## 2018-08-10 DIAGNOSIS — Z13.6 SCREENING FOR ABDOMINAL AORTIC ANEURYSM: ICD-10-CM

## 2018-08-10 PROCEDURE — 76706 US ABDL AORTA SCREEN AAA: CPT | Performed by: RADIOLOGY

## 2018-08-10 PROCEDURE — 76706 US ABDL AORTA SCREEN AAA: CPT

## 2018-09-20 ENCOUNTER — TELEPHONE (OUTPATIENT)
Dept: CARDIOLOGY | Facility: CLINIC | Age: 68
End: 2018-09-20

## 2018-09-20 NOTE — TELEPHONE ENCOUNTER
This is a small aneurysm and needs nothing else done at this time.  Needs to stop smoking!!!  Please call to inform.

## 2018-09-20 NOTE — TELEPHONE ENCOUNTER
Patient's friend called concerned about his abd US done and wants to know if he needs to be seen.   Apparently was ordered by Dr. Owens.  I told her that I would make sure that Dr. Sawyer has seen the report and we would call if anything needed to be done at this time or to just monitor it.   The report is in the chart.

## 2018-09-21 NOTE — TELEPHONE ENCOUNTER
Talked with the patient and given him Dr. Sawyer's comments and understood that he needs nothing else at this time.    He says that he stopped 3 days ago!

## 2018-10-25 ENCOUNTER — TELEPHONE (OUTPATIENT)
Dept: CARDIOLOGY | Facility: CLINIC | Age: 68
End: 2018-10-25

## 2018-10-25 NOTE — TELEPHONE ENCOUNTER
"Patient's friend called to report the patient had been having chest pain. I called the patient and he reported that he felt, \"lightening bolt-like pain\". Lasts a few seconds, goes away spontaneously. Has occurred a couple of times over the past couple of weeks. Occurs at rest. BP is, \"good\". No readings. Denies swelling or shortness of breath.    Last stress test 4/2017- low risk study.  Dr. Bhandari read a heart monitor 12/19/2017- PAC's and PVC's.    On metoprolol succ 25 mg daily  ASA 81 mg daily  rosuvastatin 20 mg daily  "

## 2018-10-26 NOTE — TELEPHONE ENCOUNTER
I spoke with the patient and he verbalized understanding. He is following up with his PCP and having labs next week.

## 2018-10-28 DIAGNOSIS — E78.5 HYPERLIPIDEMIA LDL GOAL <70: ICD-10-CM

## 2018-10-29 RX ORDER — ROSUVASTATIN CALCIUM 20 MG/1
TABLET, COATED ORAL
Qty: 90 TABLET | Refills: 3 | Status: SHIPPED | OUTPATIENT
Start: 2018-10-29 | End: 2020-02-11 | Stop reason: SDUPTHER

## 2019-01-22 ENCOUNTER — TRANSCRIBE ORDERS (OUTPATIENT)
Dept: ADMINISTRATIVE | Facility: HOSPITAL | Age: 69
End: 2019-01-22

## 2019-01-22 DIAGNOSIS — I71.40 ABDOMINAL AORTIC ANEURYSM WITHOUT RUPTURE (HCC): Primary | ICD-10-CM

## 2019-01-24 ENCOUNTER — HOSPITAL ENCOUNTER (OUTPATIENT)
Dept: ULTRASOUND IMAGING | Facility: HOSPITAL | Age: 69
Discharge: HOME | End: 2019-01-24
Admitting: INTERNAL MEDICINE

## 2019-01-24 DIAGNOSIS — I71.40 ABDOMINAL AORTIC ANEURYSM WITHOUT RUPTURE (HCC): ICD-10-CM

## 2019-01-24 PROCEDURE — 76775 US EXAM ABDO BACK WALL LIM: CPT | Performed by: RADIOLOGY

## 2019-01-24 PROCEDURE — 76706 US ABDL AORTA SCREEN AAA: CPT

## 2019-07-15 PROBLEM — I71.40 ABDOMINAL AORTIC ANEURYSM (AAA) WITHOUT RUPTURE: Status: ACTIVE | Noted: 2019-07-15

## 2019-07-15 NOTE — PROGRESS NOTES
Encounter Date:07/19/2019    Patient ID: Jose Nesbitt is a 68 y.o. male who resides in [place], [place]    CC/Reason for visit:  Shortness of Breath (1 year follow up); Hyperlipidemia; and Coronary Artery Disease           Problem List Items Addressed This Visit        Cardiovascular and Mediastinum    Coronary artery disease involving native coronary artery of native heart with angina pectoris (CMS/HCC) - Primary    Overview     · Cardiac catheterization for NSTEMI by Travon Ramey (10/11/2016):  Ulcerated critical distal left main disease and multivessel CAD.  · CABG by Dr. Joe Hogue (10/12/2016):  LIMA to LAD, SVG to ramus, SVG to OM1  · Exercise nuclear stress (4/6/17): Exercised for 8.5 minutes. No ischemia/infarct. LVEF 67%.         Current Assessment & Plan     · Obtain echocardiogram for worsening dyspnea  · Continue aspirin 81 mg daily  · Continue metoprolol succinate 25 mg daily         Relevant Medications    metoprolol succinate XL (TOPROL-XL) 25 MG 24 hr tablet    PVC's (premature ventricular contractions)    Overview     · 48 hour Holter monitor (12/22/2017): Occasional PVCs and PACs.  No arrhythmias or pauses         Current Assessment & Plan     · Continue metoprolol succinate 25 mg daily         Relevant Medications    metoprolol succinate XL (TOPROL-XL) 25 MG 24 hr tablet    Hyperlipidemia LDL goal <70    Overview     · High intensity statin therapy indicated given the presence coronary disease         Current Assessment & Plan     · Crestor 20 mg daily  · Obtain CMP and lipid profile         Relevant Orders    Lipid Panel    Comprehensive Metabolic Panel    Systolic murmur    Current Assessment & Plan     · Obtain echocardiogram         Relevant Orders    Adult Transthoracic Echo Complete W/ Cont if Necessary Per Protocol    Abdominal aortic aneurysm (AAA) without rupture (CMS/HCC)    Overview     · Abdominal ultrasound (08/2018): 3.2 cm infrarenal abdominal aortic aneurysm.  · Abdominal  "ultrasound (01/24/2019): 3.2 cm infrarenal abdominal aortic aneurysm         Current Assessment & Plan     · Continue yearly surveillance no change in aneurysm on ultrasound in January            Other    Tobacco abuse    Overview     · CT scan of the chest for lung cancer screening (7/25/2018):Chronic lung changes with upper lobe predominant emphysema and central bronchiectasis however no dominant pulmonary nodule. Lung RADS category 1 with continued recommended annual follow-up.         Current Assessment & Plan     · Obtain CT of the chest for lung cancer screening per last CT scan recommendations  · Patient working on quitting smoking.         Relevant Orders    CT Chest With & Without Contrast      Other Visit Diagnoses     Fatigue, unspecified type        Relevant Orders    CBC & Differential        Patient reports progressive dyspnea.  His CT scan did show some upper lobe emphysema and central bronchiectasis.  He is trying to quit smoking.  I will repeat the CT scan of the chest for lung cancer screening.  I will also obtain an echocardiogram.  If the above results are normal but his shortness of breath progresses he is to contact us and we will order a stress test.  If we do end up getting a stress test and it is normal I would recommend PFTs.  Send him for CMP, CBC and lipid profile today.  Spent over 10 minutes counseling the patient on the importance of smoking cessation.  I offered to prescribe him Chantix but he declines and wants to quit \"cold turkey\".  Also counseled him on the importance of stopping easing e-cigarettes.  We will schedule follow-up for 12 months or sooner pending the results of the above studies        · CT scan of the chest with and without contrast for lung cancer screening and increased dyspnea  · Echocardiogram for dyspnea and audible murmur  · If CT and echo were normal but shortness of breath progresses patient to contact us and we will get a stress test  · Recommend immediate " "smoking cessation  Return in about 1 year (around 7/19/2020), or if symptoms worsen or fail to improve, for Follow-up with Dr. Sawyer next visit.            Jose Nesbitt returns today for follow-up of his coronary artery disease, cardiac risk factors and PVCs.  At his last visit a CT scan for lung cancer screening was ordered due to his history of tobacco abuse.  Results did not show any pulmonary nodules but there was chronic lung changes with upper lobe predominant emphysema and central bronchiectasis.  Study was classified as RADS category 1 with continued recommended annual follow-up.  He also underwent a abdominal ultrasound which showed a 3.2 cm infrarenal abdominal aortic aneurysm.  He had a repeat ultrasound in January that showed no change.  Patient also contacted our office reporting \"lightening bolt \" like chest pain that was atypical for angina.  He has not experienced any further chest discomfort since then.  His main complaint today is of worsening dyspnea.  He does remain active but has noticed an increase in fatigue and heat intolerance and becomes winded more easily than he used to.  He has attributed to his long-term smoking.  He is trying to quit smoking.  He has not had a cigarette for 2 weeks but has been using a e-cigarette.  Several months ago he was up to 2 packs a day and he was experiencing some lightheadedness.  After cutting back his cigarettes his lightheadedness went away.  He has not had any lab work for almost a year.  He takes daily statin therapy and tolerates without reports myalgias.  He reports his blood pressures have stayed well controlled.    Review of Systems   Constitution: Negative for weakness and malaise/fatigue.   Eyes: Negative for vision loss in left eye and vision loss in right eye.   Cardiovascular: Positive for dyspnea on exertion. Negative for chest pain, near-syncope, orthopnea, palpitations, paroxysmal nocturnal dyspnea and syncope.   Respiratory: Positive for " "shortness of breath.    Musculoskeletal: Negative for myalgias.   Neurological: Negative for brief paralysis, excessive daytime sleepiness, focal weakness, numbness and paresthesias.   All other systems reviewed and are negative.      The patient's past medical, social, family history and ROS reviewed in the patient's electronic medical record.    Allergies  Patient has no known allergies.    Outpatient Medications Marked as Taking for the 7/19/19 encounter (Office Visit) with Yuni Mccarthy APRN   Medication Sig Dispense Refill   • aspirin 81 MG EC tablet Take 81 mg by mouth Daily.     • diazePAM (VALIUM) 5 MG tablet Take 5 mg by mouth As Needed for Anxiety.     • esomeprazole (NexIUM) 40 MG capsule Take 40 mg by mouth As Needed.     • metoprolol succinate XL (TOPROL-XL) 25 MG 24 hr tablet Take 25 mg by mouth Daily.     • rosuvastatin (CRESTOR) 20 MG tablet TAKE ONE TABLET TABLET EVERY DAY FOR CHOLESTEROL 90 tablet 3           Blood pressure 119/72, pulse 74, height 180.3 cm (71\"), weight 88.9 kg (196 lb).  Body mass index is 27.34 kg/m².  Vitals:    07/19/19 0906   Patient Position: Sitting       Physical Exam   Constitutional: He is oriented to person, place, and time. He appears well-developed and well-nourished.   HENT:   Head: Normocephalic and atraumatic.   Eyes: Pupils are equal, round, and reactive to light. No scleral icterus.   Neck: No JVD present. Carotid bruit is not present. No thyromegaly present.   Cardiovascular: Normal rate and regular rhythm. Exam reveals no gallop.   No murmur heard.  Pulmonary/Chest: Effort normal and breath sounds normal.   Abdominal: Soft. He exhibits no distension. There is no hepatosplenomegaly.   Musculoskeletal: He exhibits no edema.   Neurological: He is alert and oriented to person, place, and time.   Skin: Skin is warm and dry.   Psychiatric: He has a normal mood and affect. His behavior is normal.       Data Review (reviewed with patient):     Procedures    Lab " Results   Component Value Date    CHOL 204 (H) 10/12/2016    TRIG 147 10/12/2016    HDL 40 10/12/2016     (H) 10/12/2016    AST 50 (H) 10/12/2016    ALT 35 10/12/2016       Lab Results   Component Value Date    HGBA1C 6.00 10/12/2016           Yuni Mccarthy, SULY  7/19/2019

## 2019-07-19 ENCOUNTER — OFFICE VISIT (OUTPATIENT)
Dept: CARDIOLOGY | Facility: CLINIC | Age: 69
End: 2019-07-19

## 2019-07-19 VITALS
WEIGHT: 196 LBS | HEART RATE: 74 BPM | HEIGHT: 71 IN | BODY MASS INDEX: 27.44 KG/M2 | SYSTOLIC BLOOD PRESSURE: 119 MMHG | DIASTOLIC BLOOD PRESSURE: 72 MMHG

## 2019-07-19 DIAGNOSIS — I71.40 ABDOMINAL AORTIC ANEURYSM (AAA) WITHOUT RUPTURE (HCC): ICD-10-CM

## 2019-07-19 DIAGNOSIS — R53.83 FATIGUE, UNSPECIFIED TYPE: ICD-10-CM

## 2019-07-19 DIAGNOSIS — Z72.0 TOBACCO ABUSE: ICD-10-CM

## 2019-07-19 DIAGNOSIS — I49.3 PVC'S (PREMATURE VENTRICULAR CONTRACTIONS): ICD-10-CM

## 2019-07-19 DIAGNOSIS — R01.1 SYSTOLIC MURMUR: ICD-10-CM

## 2019-07-19 DIAGNOSIS — I25.119 CORONARY ARTERY DISEASE INVOLVING NATIVE CORONARY ARTERY OF NATIVE HEART WITH ANGINA PECTORIS (HCC): Primary | ICD-10-CM

## 2019-07-19 DIAGNOSIS — E78.5 HYPERLIPIDEMIA LDL GOAL <70: ICD-10-CM

## 2019-07-19 PROCEDURE — 99214 OFFICE O/P EST MOD 30 MIN: CPT | Performed by: NURSE PRACTITIONER

## 2019-07-19 RX ORDER — ASPIRIN 81 MG/1
81 TABLET ORAL DAILY
COMMUNITY
End: 2020-07-22 | Stop reason: SDUPTHER

## 2019-07-19 RX ORDER — METOPROLOL SUCCINATE 25 MG/1
25 TABLET, EXTENDED RELEASE ORAL DAILY
COMMUNITY
End: 2020-07-22 | Stop reason: SDUPTHER

## 2019-07-19 NOTE — ASSESSMENT & PLAN NOTE
· Plan on repeating abdominal ultrasound for surveillance of abdominal aortic aneurysm at next follow-up

## 2019-07-19 NOTE — ASSESSMENT & PLAN NOTE
· Obtain CT of the chest for lung cancer screening per last CT scan recommendations  · Patient working on quitting smoking.

## 2019-07-19 NOTE — ASSESSMENT & PLAN NOTE
· Obtain echocardiogram for worsening dyspnea  · Continue aspirin 81 mg daily  · Continue metoprolol succinate 25 mg daily

## 2019-08-12 ENCOUNTER — HOSPITAL ENCOUNTER (OUTPATIENT)
Dept: CT IMAGING | Facility: HOSPITAL | Age: 69
Discharge: HOME | End: 2019-08-12

## 2019-08-12 ENCOUNTER — HOSPITAL ENCOUNTER (OUTPATIENT)
Dept: CARDIOLOGY | Facility: HOSPITAL | Age: 69
Discharge: HOME | End: 2019-08-12
Admitting: NURSE PRACTITIONER

## 2019-08-12 DIAGNOSIS — Z72.0 TOBACCO ABUSE: ICD-10-CM

## 2019-08-12 DIAGNOSIS — R01.1 SYSTOLIC MURMUR: ICD-10-CM

## 2019-08-12 LAB — CREAT BLDA-MCNC: 0.9 MG/DL (ref 0.6–1.3)

## 2019-08-12 PROCEDURE — 71270 CT THORAX DX C-/C+: CPT

## 2019-08-12 PROCEDURE — 82565 ASSAY OF CREATININE: CPT

## 2019-08-12 PROCEDURE — 93306 TTE W/DOPPLER COMPLETE: CPT

## 2019-08-12 PROCEDURE — 25010000002 IOPAMIDOL 61 % SOLUTION: Performed by: NURSE PRACTITIONER

## 2019-08-12 PROCEDURE — 93306 TTE W/DOPPLER COMPLETE: CPT | Performed by: INTERNAL MEDICINE

## 2019-08-12 RX ADMIN — IOPAMIDOL 95 ML: 612 INJECTION, SOLUTION INTRAVENOUS at 09:50

## 2019-08-14 LAB
BH CV ECHO MEAS - AO MAX PG (FULL): 3.6 MMHG
BH CV ECHO MEAS - AO MAX PG: 14 MMHG
BH CV ECHO MEAS - AO MEAN PG (FULL): 2 MMHG
BH CV ECHO MEAS - AO MEAN PG: 7 MMHG
BH CV ECHO MEAS - AO ROOT AREA (BSA CORRECTED): 1.7
BH CV ECHO MEAS - AO ROOT AREA: 9.6 CM^2
BH CV ECHO MEAS - AO ROOT DIAM: 3.5 CM
BH CV ECHO MEAS - AO V2 MAX: 188 CM/SEC
BH CV ECHO MEAS - AO V2 MEAN: 120 CM/SEC
BH CV ECHO MEAS - AO V2 VTI: 42 CM
BH CV ECHO MEAS - AVA(I,A): 3.3 CM^2
BH CV ECHO MEAS - AVA(I,D): 3.3 CM^2
BH CV ECHO MEAS - AVA(V,A): 3.6 CM^2
BH CV ECHO MEAS - AVA(V,D): 3.6 CM^2
BH CV ECHO MEAS - BSA(HAYCOCK): 2.1 M^2
BH CV ECHO MEAS - BSA: 2.1 M^2
BH CV ECHO MEAS - BZI_BMI: 27.3 KILOGRAMS/M^2
BH CV ECHO MEAS - BZI_METRIC_HEIGHT: 180.3 CM
BH CV ECHO MEAS - BZI_METRIC_WEIGHT: 88.9 KG
BH CV ECHO MEAS - EDV(CUBED): 72.5 ML
BH CV ECHO MEAS - EDV(MOD-SP2): 88 ML
BH CV ECHO MEAS - EDV(MOD-SP4): 100 ML
BH CV ECHO MEAS - EDV(TEICH): 77.3 ML
BH CV ECHO MEAS - EF(CUBED): 63.1 %
BH CV ECHO MEAS - EF(MOD-BP): 61 %
BH CV ECHO MEAS - EF(MOD-SP2): 63.6 %
BH CV ECHO MEAS - EF(MOD-SP4): 59 %
BH CV ECHO MEAS - EF(TEICH): 55.1 %
BH CV ECHO MEAS - ESV(CUBED): 26.7 ML
BH CV ECHO MEAS - ESV(MOD-SP2): 32 ML
BH CV ECHO MEAS - ESV(MOD-SP4): 41 ML
BH CV ECHO MEAS - ESV(TEICH): 34.7 ML
BH CV ECHO MEAS - FS: 28.3 %
BH CV ECHO MEAS - IVS/LVPW: 0.91
BH CV ECHO MEAS - IVSD: 1.2 CM
BH CV ECHO MEAS - LA DIMENSION: 4.2 CM
BH CV ECHO MEAS - LA/AO: 1.2
BH CV ECHO MEAS - LAD MAJOR: 6.1 CM
BH CV ECHO MEAS - LAT PEAK E' VEL: 8.1 CM/SEC
BH CV ECHO MEAS - LATERAL E/E' RATIO: 5.9
BH CV ECHO MEAS - LV DIASTOLIC VOL/BSA (35-75): 47.8 ML/M^2
BH CV ECHO MEAS - LV MASS(C)D: 179.5 GRAMS
BH CV ECHO MEAS - LV MASS(C)DI: 85.8 GRAMS/M^2
BH CV ECHO MEAS - LV MAX PG: 10.4 MMHG
BH CV ECHO MEAS - LV MEAN PG: 5 MMHG
BH CV ECHO MEAS - LV SYSTOLIC VOL/BSA (12-30): 19.6 ML/M^2
BH CV ECHO MEAS - LV V1 MAX: 161 CM/SEC
BH CV ECHO MEAS - LV V1 MEAN: 97 CM/SEC
BH CV ECHO MEAS - LV V1 VTI: 33.3 CM
BH CV ECHO MEAS - LVIDD: 4.2 CM
BH CV ECHO MEAS - LVIDS: 3 CM
BH CV ECHO MEAS - LVLD AP2: 8.6 CM
BH CV ECHO MEAS - LVLD AP4: 8.8 CM
BH CV ECHO MEAS - LVLS AP2: 7 CM
BH CV ECHO MEAS - LVLS AP4: 7.5 CM
BH CV ECHO MEAS - LVOT AREA (M): 4.2 CM^2
BH CV ECHO MEAS - LVOT AREA: 4.2 CM^2
BH CV ECHO MEAS - LVOT DIAM: 2.3 CM
BH CV ECHO MEAS - LVPWD: 1.3 CM
BH CV ECHO MEAS - MED PEAK E' VEL: 5.7 CM/SEC
BH CV ECHO MEAS - MEDIAL E/E' RATIO: 8.3
BH CV ECHO MEAS - MV A MAX VEL: 70.1 CM/SEC
BH CV ECHO MEAS - MV DEC SLOPE: 251 CM/SEC^2
BH CV ECHO MEAS - MV DEC TIME: 0.25 SEC
BH CV ECHO MEAS - MV E MAX VEL: 47.4 CM/SEC
BH CV ECHO MEAS - MV E/A: 0.68
BH CV ECHO MEAS - MV P1/2T MAX VEL: 83.6 CM/SEC
BH CV ECHO MEAS - MV P1/2T: 97.6 MSEC
BH CV ECHO MEAS - MVA P1/2T LCG: 2.6 CM^2
BH CV ECHO MEAS - MVA(P1/2T): 2.3 CM^2
BH CV ECHO MEAS - PA ACC SLOPE: 756 CM/SEC^2
BH CV ECHO MEAS - PA ACC TIME: 0.12 SEC
BH CV ECHO MEAS - PA PR(ACCEL): 26.8 MMHG
BH CV ECHO MEAS - PI END-D VEL: 25 CM/SEC
BH CV ECHO MEAS - RAP SYSTOLE: 3 MMHG
BH CV ECHO MEAS - RVSP: 16.2 MMHG
BH CV ECHO MEAS - SI(AO): 193.3 ML/M^2
BH CV ECHO MEAS - SI(CUBED): 21.9 ML/M^2
BH CV ECHO MEAS - SI(LVOT): 66.2 ML/M^2
BH CV ECHO MEAS - SI(MOD-SP2): 26.8 ML/M^2
BH CV ECHO MEAS - SI(MOD-SP4): 28.2 ML/M^2
BH CV ECHO MEAS - SI(TEICH): 20.3 ML/M^2
BH CV ECHO MEAS - SV(AO): 404.1 ML
BH CV ECHO MEAS - SV(CUBED): 45.8 ML
BH CV ECHO MEAS - SV(LVOT): 138.4 ML
BH CV ECHO MEAS - SV(MOD-SP2): 56 ML
BH CV ECHO MEAS - SV(MOD-SP4): 59 ML
BH CV ECHO MEAS - SV(TEICH): 42.5 ML
BH CV ECHO MEAS - TR MAX PG: 13.2 MMHG
BH CV ECHO MEAS - TR MAX VEL: 182 CM/SEC
BH CV ECHO MEASUREMENTS AVERAGE E/E' RATIO: 6.87
LEFT ATRIUM VOLUME INDEX: 36.3 ML/M^2
LEFT ATRIUM VOLUME: 76 ML
LV EF 2D ECHO EST: 70 %

## 2019-08-19 ENCOUNTER — TELEPHONE (OUTPATIENT)
Dept: CARDIOLOGY | Facility: CLINIC | Age: 69
End: 2019-08-19

## 2019-08-19 NOTE — TELEPHONE ENCOUNTER
Per SULY Gruber- Let him know echo is okay.  I would recommend he get PFTs for his shortness of breath as his CT scan does show some emphysema and bronchiectasis.     Patient verbalized understanding and does not want to proceed with PFT's at this time. He will call back if he changes his mind. He also reports he will try to stop smoking.

## 2020-02-11 DIAGNOSIS — E78.5 HYPERLIPIDEMIA LDL GOAL <70: ICD-10-CM

## 2020-02-11 RX ORDER — ROSUVASTATIN CALCIUM 20 MG/1
20 TABLET, COATED ORAL DAILY
Qty: 90 TABLET | Refills: 0 | Status: SHIPPED | OUTPATIENT
Start: 2020-02-11 | End: 2020-07-22 | Stop reason: SDUPTHER

## 2020-06-22 ENCOUNTER — TRANSCRIBE ORDERS (OUTPATIENT)
Dept: ADMINISTRATIVE | Facility: HOSPITAL | Age: 70
End: 2020-06-22

## 2020-06-22 DIAGNOSIS — I71.40 ABDOMINAL AORTIC ANEURYSM WITHOUT RUPTURE (HCC): Primary | ICD-10-CM

## 2020-07-10 ENCOUNTER — HOSPITAL ENCOUNTER (OUTPATIENT)
Dept: ULTRASOUND IMAGING | Facility: HOSPITAL | Age: 70
Discharge: HOME | End: 2020-07-10
Admitting: INTERNAL MEDICINE

## 2020-07-10 DIAGNOSIS — I71.40 ABDOMINAL AORTIC ANEURYSM WITHOUT RUPTURE (HCC): ICD-10-CM

## 2020-07-10 PROCEDURE — 76706 US ABDL AORTA SCREEN AAA: CPT | Performed by: RADIOLOGY

## 2020-07-10 PROCEDURE — 76706 US ABDL AORTA SCREEN AAA: CPT

## 2020-07-21 NOTE — PROGRESS NOTES
[facility] at [facility]  Office Visit Note    DATE: 07/22/2020    IDENTIFICATION: Jose Nesbitt is a 69 y.o. male who resides in [place], [place].    REASON FOR VISIT:  • Coronary artery disease  • PVCs  • Abdominal aortic aneurysm  • Cardiac risk factors            Jose Nesbitt returns for routine office visit.  Curtis is been doing well with no angina or heart failure symptoms.  He continues to do renae and says that he out works [id number].  He denies palpitations symptoms.  He is trying to quit smoking with Chantix.  He recently had a AAA scan which showed incremental increase in abdominal aortic size from 3.3 to 3.5 cm    Review of Systems   All other systems reviewed and are negative.      The patient's past medical, social, family history and ROS reviewed in the patient's electronic medical record.    No Known Allergies      Current Outpatient Medications:   •  aspirin 81 MG EC tablet, Take 1 tablet by mouth Daily., Disp: , Rfl:   •  Cholecalciferol (VITAMIN D3) 1.25 MG (25663 UT) tablet, 1 capsule Daily., Disp: , Rfl:   •  diazePAM (VALIUM) 5 MG tablet, Take 5 mg by mouth As Needed for Anxiety., Disp: , Rfl:   •  esomeprazole (NexIUM) 40 MG capsule, Take 40 mg by mouth As Needed., Disp: , Rfl:   •  metoprolol succinate XL (TOPROL-XL) 25 MG 24 hr tablet, Take 1 tablet by mouth Daily., Disp: 90 tablet, Rfl: 3  •  rosuvastatin (CRESTOR) 20 MG tablet, Take 1 tablet by mouth Every Night., Disp: 90 tablet, Rfl: 3  •  tamsulosin (Flomax) 0.4 MG capsule 24 hr capsule, Take 1 capsule by mouth Daily., Disp: , Rfl:   •  varenicline (CHANTIX) 0.5 MG tablet, Take 0.5 mg by mouth 2 (Two) Times a Day. Started July 21 to 1 mg next week, Disp: , Rfl:     Past Medical History:   Diagnosis Date   • Acid reflux    • Cancer (CMS/HCC)     Skin cancer   • Coronary artery disease    • Hyperlipidemia    • Hypertension    • Neck discomfort     RELATED TO FALL       Past Surgical History:   Procedure Laterality  "Date   • CARDIAC CATHETERIZATION N/A 10/11/2016    Procedure: Left Heart Cath;  Surgeon: Travon Ramey DO;  Location:  [facility] CATH INVASIVE LOCATION;  Service:    • CORONARY ARTERY BYPASS GRAFT N/A 10/12/2016    Procedure: ROSALINDA PER ANESTHESIA, MEDIAN STERNOTOMY, CORONARY ARTERY BYPASS GRAFT X  3, UTILIZING THE LEFT INTERNAL MAMMARY ARTERY AND EVH OF RIGHT GREATER SAPHENOUS VEIN;  Surgeon: Joe Hogue MD;  Location:  [facility];  Service:    • CYST REMOVAL      FROM CHEST, APPROX 20 YEARS AGO   • HERNIA REPAIR      APPROX 30 YEARS AGO       Family History   Problem Relation Age of Onset   • Diabetes Mother 77   • Pneumonia Father 90   • Heart attack Sister 63       Social History     Tobacco Use   • Smoking status: Current Every Day Smoker     Packs/day: 1.00     Years: 50.00     Pack years: 50.00     Types: Cigarettes   • Smokeless tobacco: Never Used   Substance Use Topics   • Alcohol use: Yes     Alcohol/week: 3.0 standard drinks     Types: 2 Cans of beer, 1 Shots of liquor per week     Comment: occasional           Blood pressure 124/70, pulse 78, temperature 98.7 °F (37.1 °C), height 180.3 cm (71\"), weight 90.7 kg (200 lb), SpO2 96 %.  Body mass index is 27.89 kg/m².  Vitals:    07/22/20 0918   Patient Position: Sitting       Physical Exam   Constitutional: He is oriented to person, place, and time. He appears well-developed and well-nourished.   HENT:   Head: Normocephalic and atraumatic.   Eyes: Pupils are equal, round, and reactive to light. No scleral icterus.   Neck: No JVD present. Carotid bruit is not present. No thyromegaly present.   Cardiovascular: Normal rate, regular rhythm, S1 normal and S2 normal. Exam reveals no gallop.   Murmur heard.   Harsh midsystolic murmur is present with a grade of 1/6 at the upper right sternal border radiating to the neck.  Pulmonary/Chest: Effort normal and breath sounds normal.   Abdominal: Soft. He exhibits no mass. There is no hepatosplenomegaly. There is no " tenderness.   Neurological: He is alert and oriented to person, place, and time.   Skin: Skin is warm and dry. No cyanosis. Nails show no clubbing.   Psychiatric: He has a normal mood and affect. His behavior is normal.       Data Review (reviewed with patient):     Procedures    Lab date: 06/04/2020  • FLP: , , HDL 34,   • CMP: Glu 111, BUN 19, Creat 0.82, eGFR 92, Na 138, K 4.3, Cl 105, CO2 23, Ca 9.2, Alk Phos 70, AST 24, ALT 33  • CBC: WBC 6.8, RBC 5.44, HGB 15.9, HCT 47.4, MCV 87.1, MCH 29.2,   • HbA1c: 6.2          Problem List Items Addressed This Visit        Cardiology Problems    Coronary artery disease involving native coronary artery of native heart with angina pectoris (CMS/[facility]) - Primary    Overview     · Cardiac catheterization for NSTEMI by Travon Ramey (10/11/2016):  Ulcerated critical distal left main disease and multivessel CAD.  · CABG by Dr. Joe Hogue (10/12/2016):  LIMA to LAD, SVG to ramus, SVG to OM1  · Exercise nuclear stress (4/6/17): Exercised for 8.5 minutes. No ischemia/infarct. LVEF 67%.         Current Assessment & Plan     · No angina  · Continue low-dose aspirin, beta-blocker, and high intensity statin         Relevant Medications    metoprolol succinate XL (TOPROL-XL) 25 MG 24 hr tablet    aspirin 81 MG EC tablet    Hyperlipidemia LDL goal <70    Overview     · High intensity statin therapy indicated given the presence coronary disease         Current Assessment & Plan     · Continue rosuvastatin         Relevant Medications    rosuvastatin (CRESTOR) 20 MG tablet    Abdominal aortic aneurysm (AAA) without rupture (CMS/[facility])    Overview     · Abdominal ultrasound (08/2018): 3.2 cm infrarenal abdominal aortic aneurysm.  · Abdominal ultrasound (01/24/2019): 3.2 cm infrarenal abdominal aortic aneurysm  · Abdominal US (07/10/2020): 3.5 cm abdominal aortic aneurysm.         Current Assessment & Plan     · Repeat abdominal ultrasound in 1 year          Relevant Orders    Abdominal Aortic Aneurysm Screening Medicare CAR    Aortic valve sclerosis    Overview     · Echo (08/14/2019): LVEF 70%. Aortic valve sclerosis present. There is no significant stenosis.         Current Assessment & Plan     · Stable systolic murmur         Relevant Medications    metoprolol succinate XL (TOPROL-XL) 25 MG 24 hr tablet       Other    Tobacco abuse    Overview     · CT scan of the chest for lung cancer screening (7/25/2018):Chronic lung changes with upper lobe predominant emphysema and central bronchiectasis however no dominant pulmonary nodule. Lung RADS category 1 with continued recommended annual follow-up.                    · Continue present medical therapy  · Smoking cessation recommended  · AAA ultrasound screening in 1 year  Return in about 1 year (around 7/22/2021).      MIKE Sawyer MD [facility], [facility]  Interventional and General Cardiology      7/22/2020

## 2020-07-22 ENCOUNTER — OFFICE VISIT (OUTPATIENT)
Dept: CARDIOLOGY | Facility: CLINIC | Age: 70
End: 2020-07-22

## 2020-07-22 VITALS
TEMPERATURE: 98.7 F | SYSTOLIC BLOOD PRESSURE: 124 MMHG | HEIGHT: 71 IN | DIASTOLIC BLOOD PRESSURE: 70 MMHG | HEART RATE: 78 BPM | OXYGEN SATURATION: 96 % | BODY MASS INDEX: 28 KG/M2 | WEIGHT: 200 LBS

## 2020-07-22 DIAGNOSIS — E78.5 HYPERLIPIDEMIA LDL GOAL <70: ICD-10-CM

## 2020-07-22 DIAGNOSIS — Z72.0 TOBACCO ABUSE: ICD-10-CM

## 2020-07-22 DIAGNOSIS — I35.8 AORTIC VALVE SCLEROSIS: ICD-10-CM

## 2020-07-22 DIAGNOSIS — I71.40 ABDOMINAL AORTIC ANEURYSM (AAA) WITHOUT RUPTURE (HCC): ICD-10-CM

## 2020-07-22 DIAGNOSIS — I25.119 CORONARY ARTERY DISEASE INVOLVING NATIVE CORONARY ARTERY OF NATIVE HEART WITH ANGINA PECTORIS (HCC): Primary | ICD-10-CM

## 2020-07-22 PROCEDURE — 99213 OFFICE O/P EST LOW 20 MIN: CPT | Performed by: INTERNAL MEDICINE

## 2020-07-22 RX ORDER — VARENICLINE TARTRATE 0.5 MG/1
0.5 TABLET, FILM COATED ORAL 2 TIMES DAILY
COMMUNITY
End: 2021-08-06

## 2020-07-22 RX ORDER — TAMSULOSIN HYDROCHLORIDE 0.4 MG/1
1 CAPSULE ORAL DAILY
Status: ON HOLD | COMMUNITY
Start: 2019-05-23 | End: 2022-11-07 | Stop reason: SDDI

## 2020-07-22 RX ORDER — ASPIRIN 81 MG/1
81 TABLET ORAL DAILY
Start: 2020-07-22

## 2020-07-22 RX ORDER — METOPROLOL SUCCINATE 25 MG/1
25 TABLET, EXTENDED RELEASE ORAL DAILY
Qty: 90 TABLET | Refills: 3 | Status: SHIPPED | OUTPATIENT
Start: 2020-07-22 | End: 2021-08-06 | Stop reason: SDUPTHER

## 2020-07-22 RX ORDER — ROSUVASTATIN CALCIUM 20 MG/1
20 TABLET, COATED ORAL NIGHTLY
Qty: 90 TABLET | Refills: 3 | Status: SHIPPED | OUTPATIENT
Start: 2020-07-22 | End: 2021-08-06 | Stop reason: SDUPTHER

## 2020-08-18 ENCOUNTER — EPISODE CHANGES (OUTPATIENT)
Dept: CASE MANAGEMENT | Facility: OTHER | Age: 70
End: 2020-08-18

## 2021-04-15 ENCOUNTER — OFFICE VISIT (OUTPATIENT)
Dept: UROLOGY | Facility: CLINIC | Age: 71
End: 2021-04-15

## 2021-04-15 VITALS — BODY MASS INDEX: 27.99 KG/M2 | HEIGHT: 71 IN | WEIGHT: 199.96 LBS | TEMPERATURE: 98.4 F

## 2021-04-15 DIAGNOSIS — N40.1 BENIGN PROSTATIC HYPERPLASIA WITH URINARY OBSTRUCTION: Primary | ICD-10-CM

## 2021-04-15 DIAGNOSIS — N13.8 BENIGN PROSTATIC HYPERPLASIA WITH URINARY OBSTRUCTION: Primary | ICD-10-CM

## 2021-04-15 PROCEDURE — 99203 OFFICE O/P NEW LOW 30 MIN: CPT | Performed by: UROLOGY

## 2021-04-15 NOTE — PROGRESS NOTES
"Chief Complaint:          BPH    HPI:   70 y.o. male referred for evaluation of BPH.  He had a PSA that was \"okay\" by Dr. Owens at [facility] he has a history of stones, history of an abdominal aortic aneurysm, negative colonoscopy recently, no symptomatology no dysuria, on Flomax per Dr. Darby it helps somewhat.  He reports no lower urinary tract symptomatology particularly irritative symptoms such as frequency urgency dysuria and obstructive symptomatology particularly dribbling, hesitancy, intermittency.  I need to rule out a stricture I will set him up for a local cystoscopy      Past Medical History:        Past Medical History:   Diagnosis Date   • Acid reflux    • Cancer (CMS/HCC)     Skin cancer   • Coronary artery disease    • Hyperlipidemia    • Hypertension    • Neck discomfort     RELATED TO FALL         Current Meds:     Current Outpatient Medications   Medication Sig Dispense Refill   • aspirin 81 MG EC tablet Take 1 tablet by mouth Daily.     • Cholecalciferol (VITAMIN D3) 1.25 MG (76855 UT) tablet 1 capsule Daily.     • diazePAM (VALIUM) 5 MG tablet Take 5 mg by mouth As Needed for Anxiety.     • esomeprazole (NexIUM) 40 MG capsule Take 40 mg by mouth As Needed.     • metoprolol succinate XL (TOPROL-XL) 25 MG 24 hr tablet Take 1 tablet by mouth Daily. 90 tablet 3   • rosuvastatin (CRESTOR) 20 MG tablet Take 1 tablet by mouth Every Night. 90 tablet 3   • tamsulosin (Flomax) 0.4 MG capsule 24 hr capsule Take 1 capsule by mouth Daily.     • varenicline (CHANTIX) 0.5 MG tablet Take 0.5 mg by mouth 2 (Two) Times a Day. Started July 21 to 1 mg next week       No current facility-administered medications for this visit.        Allergies:      No Known Allergies     Past Surgical History:     Past Surgical History:   Procedure Laterality Date   • CARDIAC CATHETERIZATION N/A 10/11/2016    Procedure: Left Heart Cath;  Surgeon: Travon Ramey DO;  Location: [facility] CATH INVASIVE LOCATION;  Service:    • CORONARY " ARTERY BYPASS GRAFT N/A 10/12/2016    Procedure: ROSALINDA PER ANESTHESIA, MEDIAN STERNOTOMY, CORONARY ARTERY BYPASS GRAFT X  3, UTILIZING THE LEFT INTERNAL MAMMARY ARTERY AND EVH OF RIGHT GREATER SAPHENOUS VEIN;  Surgeon: Joe Hogue MD;  Location: [facility];  Service:    • CYST REMOVAL      FROM CHEST, APPROX 20 YEARS AGO   • HERNIA REPAIR      APPROX 30 YEARS AGO         Social History:     Social History     Socioeconomic History   • Marital status:      Spouse name: Not on file   • Number of children: 2   • Years of education: Not on file   • Highest education level: Not on file   Tobacco Use   • Smoking status: Current Every Day Smoker     Packs/day: 1.00     Years: 50.00     Pack years: 50.00     Types: Cigarettes   • Smokeless tobacco: Never Used   Substance and Sexual Activity   • Alcohol use: Yes     Alcohol/week: 3.0 standard drinks     Types: 2 Cans of beer, 1 Shots of liquor per week     Comment: occasional   • Drug use: No   • Sexual activity: Defer       Family History:     Family History   Problem Relation Age of Onset   • Diabetes Mother 77   • Pneumonia Father 90   • Heart attack Sister 63       Review of Systems:     Review of Systems   Constitutional: Negative.    HENT: Negative.    Eyes: Negative.    Respiratory: Negative.    Cardiovascular: Negative.    Gastrointestinal: Negative.    Endocrine: Negative.    Musculoskeletal: Negative.    Allergic/Immunologic: Negative.    Neurological: Negative.    Hematological: Negative.    Psychiatric/Behavioral: Negative.        Physical Exam:     Physical Exam  Vitals and nursing note reviewed.   Constitutional:       Appearance: He is well-developed.   HENT:      Head: Normocephalic and atraumatic.   Eyes:      Conjunctiva/sclera: Conjunctivae normal.      Pupils: Pupils are equal, round, and reactive to light.   Cardiovascular:      Rate and Rhythm: Normal rate and regular rhythm.      Heart sounds: Normal heart sounds.   Pulmonary:      Effort:  Pulmonary effort is normal.      Breath sounds: Normal breath sounds.   Abdominal:      General: Bowel sounds are normal.      Palpations: Abdomen is soft.   Genitourinary:     Penis: Normal.       Testes: Normal.      Prostate: Normal.      Rectum: Normal.   Musculoskeletal:         General: Normal range of motion.      Cervical back: Normal range of motion.   Skin:     General: Skin is warm and dry.   Neurological:      Mental Status: He is alert and oriented to person, place, and time.      Deep Tendon Reflexes: Reflexes are normal and symmetric.   Psychiatric:         Behavior: Behavior normal.         Thought Content: Thought content normal.         Judgment: Judgment normal.         I have reviewed the following portions of the patient's history: allergies, current medications, past family history, past medical history, past social history, past surgical history, problem list and ROS and confirm it's accurate.      Procedure:       Assessment/Plan:   BPH: Discussed the pathophysiology of BPH and obstruction.  We discussed the static and dynamic effect effects of BPH as well as using 5 alpha reductase inhibitors versus alpha blockade.  We discussed the indications for transurethral surgery as well.  And/ or other therapeutic options available including all of the newer techniques.  He has minimal symptomatology but a very slow stream consistent with stricture organ to rule out stricture with cystoscopy            Patient's There is no height or weight on file to calculate BMI. BMI is above normal parameters. Recommendations include: educational material.              This document has been electronically signed by EVELYNE ACOSTA MD April 15, 2021 13:42 EDT

## 2021-04-16 DIAGNOSIS — I71.40 ABDOMINAL AORTIC ANEURYSM WITHOUT RUPTURE (HCC): Primary | ICD-10-CM

## 2021-06-08 ENCOUNTER — HOSPITAL ENCOUNTER (OUTPATIENT)
Dept: ULTRASOUND IMAGING | Facility: HOSPITAL | Age: 71
Discharge: HOME | End: 2021-06-08
Admitting: INTERNAL MEDICINE

## 2021-06-08 DIAGNOSIS — I71.40 ABDOMINAL AORTIC ANEURYSM WITHOUT RUPTURE (HCC): ICD-10-CM

## 2021-06-08 PROCEDURE — 76706 US ABDL AORTA SCREEN AAA: CPT

## 2021-06-08 PROCEDURE — 76706 US ABDL AORTA SCREEN AAA: CPT | Performed by: RADIOLOGY

## 2021-08-05 NOTE — PROGRESS NOTES
"[facility]      Identification: Jose Nesbitt is a 70 y.o. male who resides in [place], [place].    Reason for visit:  Coronary Artery Disease      Subjective      Jose Nesbitt presents to [facility] for followup.    Curtis returns to the office for routine follow-up.  The patient has had a stable clinical course.  He reports occasional lightening bolt type pains in his chest which occur sporadically and not with exertion.  These pains last seconds.  He remains physically active and works 7 days a week.    He is scheduled to get Covid vaccine today but is apprehensive.  He read that a [place] [employer] person  5 days after getting his Covid shot.    The patient reports he has not been compliant with rosuvastatin.  He just started taking Crestor once again 5 days ago.    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  He was told it was fine            Review of Systems   Cardiovascular: Positive for chest pain.   All other systems reviewed and are negative.      Objective     /70 (BP Location: Right arm, Patient Position: Sitting)   Pulse 74   Ht 180.3 cm (71\")   Wt 90.7 kg (200 lb)   SpO2 96%   BMI 27.89 kg/m²       Constitutional:       Appearance: Healthy appearance. Well-developed.   Eyes:      General: No scleral icterus.  HENT:      Head: Normocephalic and atraumatic.   Neck:      Vascular: No carotid bruit or JVD. JVD normal.   Pulmonary:      Effort: Pulmonary effort is normal.      Breath sounds: Normal breath sounds.   Cardiovascular:      Normal rate. Regular rhythm.      Murmurs: There is no murmur.      No gallop.   Musculoskeletal:      Extremities: No clubbing present.Skin:     General: Skin is warm and dry. There is no cyanosis.   Neurological:      Mental Status: Alert.   Psychiatric:         Attention and Perception: Attention normal.         Behavior: Behavior normal.         Result Review :    Lab date: " "06/04/2020  · FLP: , , HDL 34,   · CMP: Glu 111, BUN 19, Creat 0.82, eGFR 92, Na 138, K 4.3, Cl 105, CO2 23, Ca 9.2, Alk Phos 70, AST 24, ALT 33  · CBC: WBC 6.8, RBC 5.44, HGB 15.9, HCT 47.4, MCV 87.1, MCH 29.2,   · HbA1c: 6.2      ECG 12 Lead    Date/Time: 8/6/2021 9:45 AM  Performed by: Colt Sawyer IV, MD  Authorized by: Colt Sawyer IV, MD   Comparison: compared with previous ECG from 11/19/2016  Comparison to previous ECG: First-degree AV block no prior  Rhythm: sinus rhythm  BPM: 73  Conduction: 1st degree AV block    Clinical impression: abnormal EKG             Assessment     Problem List Items Addressed This Visit        Cardiology Problems    Coronary artery disease involving native coronary artery of native heart with angina pectoris (CMS/HCC) - Primary (Chronic)    Overview     · Cardiac catheterization for NSTEMI by Travon Ramey (10/11/2016):  Ulcerated critical distal left main disease and multivessel CAD.  · CABG by Dr. Joe Hogue (10/12/2016):  LIMA to LAD, SVG to ramus, SVG to OM1  · Exercise nuclear stress (4/6/17): Exercised for 8.5 minutes. No ischemia/infarct. LVEF 67%.         Current Assessment & Plan     · No anginal symptoms  · Nonexertional \"lightening bolt\" chest pains are not angina  · Continue aspirin, beta-blocker  · Encourage patient to resume statin therapy  · Encourage smoking cessation         Relevant Medications    metoprolol succinate XL (TOPROL-XL) 25 MG 24 hr tablet    Other Relevant Orders    ECG 12 Lead    Hyperlipidemia LDL goal <70 (Chronic)    Overview     · High intensity statin therapy indicated given the presence coronary disease         Current Assessment & Plan     · Patient has been noncompliant with rosuvastatin  · Resume rosuvastatin daily         Relevant Medications    rosuvastatin (CRESTOR) 20 MG tablet    Abdominal aortic aneurysm (AAA) without rupture (CMS/HCC)    Overview     · Abdominal ultrasound " (08/2018): 3.2 cm infrarenal abdominal aortic aneurysm.  · Abdominal ultrasound (01/24/2019): 3.2 cm infrarenal abdominal aortic aneurysm  · Abdominal US (07/10/2020): 3.5 cm abdominal aortic aneurysm.  · Abdominal US (6/8/2021):  3.4 cm infrarenal abdominal aortic aneurysm.            Other    Tobacco abuse (Chronic)    Overview     · CT scan of the chest for lung cancer screening (7/25/2018):Chronic lung changes with upper lobe predominant emphysema and central bronchiectasis however no dominant pulmonary nodule. Lung RADS category 1 with continued recommended annual follow-up.         Current Assessment & Plan     · Smoking cessation recommended               Plan   • Resume rosuvastatin  • Smoking cessation strongly recommended  • Patient deserves annual low-dose CT chest for lung cancer screening  • Covid vaccination strongly encouraged        Follow-up   Return in about 1 year (around 8/6/2022).        Melecio Sawyer MD, FACC, [facility]  8/6/2021

## 2021-08-06 ENCOUNTER — IMMUNIZATION (OUTPATIENT)
Dept: VACCINE CLINIC | Facility: HOSPITAL | Age: 71
End: 2021-08-06

## 2021-08-06 ENCOUNTER — OFFICE VISIT (OUTPATIENT)
Dept: CARDIOLOGY | Facility: CLINIC | Age: 71
End: 2021-08-06

## 2021-08-06 VITALS
HEART RATE: 74 BPM | DIASTOLIC BLOOD PRESSURE: 70 MMHG | BODY MASS INDEX: 28 KG/M2 | SYSTOLIC BLOOD PRESSURE: 124 MMHG | OXYGEN SATURATION: 96 % | HEIGHT: 71 IN | WEIGHT: 200 LBS

## 2021-08-06 DIAGNOSIS — I25.119 CORONARY ARTERY DISEASE INVOLVING NATIVE CORONARY ARTERY OF NATIVE HEART WITH ANGINA PECTORIS (HCC): Primary | Chronic | ICD-10-CM

## 2021-08-06 DIAGNOSIS — Z72.0 TOBACCO ABUSE: ICD-10-CM

## 2021-08-06 DIAGNOSIS — I71.40 ABDOMINAL AORTIC ANEURYSM (AAA) WITHOUT RUPTURE (HCC): ICD-10-CM

## 2021-08-06 DIAGNOSIS — E78.5 HYPERLIPIDEMIA LDL GOAL <70: ICD-10-CM

## 2021-08-06 PROCEDURE — 91300 HC SARSCOV02 VAC 30MCG/0.3ML IM: CPT | Performed by: INTERNAL MEDICINE

## 2021-08-06 PROCEDURE — 93000 ELECTROCARDIOGRAM COMPLETE: CPT | Performed by: INTERNAL MEDICINE

## 2021-08-06 PROCEDURE — 0001A: CPT | Performed by: INTERNAL MEDICINE

## 2021-08-06 PROCEDURE — 99214 OFFICE O/P EST MOD 30 MIN: CPT | Performed by: INTERNAL MEDICINE

## 2021-08-06 RX ORDER — FINASTERIDE 5 MG/1
5 TABLET, FILM COATED ORAL DAILY
Status: ON HOLD | COMMUNITY
Start: 2021-08-02 | End: 2022-11-07 | Stop reason: SDDI

## 2021-08-06 RX ORDER — ERGOCALCIFEROL 1.25 MG/1
CAPSULE ORAL EVERY OTHER DAY
Status: ON HOLD | COMMUNITY
Start: 2021-06-08 | End: 2022-11-07 | Stop reason: SDDI

## 2021-08-06 RX ORDER — METOPROLOL SUCCINATE 25 MG/1
25 TABLET, EXTENDED RELEASE ORAL DAILY
Qty: 90 TABLET | Refills: 3 | Status: SHIPPED | OUTPATIENT
Start: 2021-08-06

## 2021-08-06 RX ORDER — ROSUVASTATIN CALCIUM 20 MG/1
20 TABLET, COATED ORAL NIGHTLY
Qty: 90 TABLET | Refills: 3 | Status: SHIPPED | OUTPATIENT
Start: 2021-08-06 | End: 2023-03-29 | Stop reason: SDUPTHER

## 2021-08-06 RX ORDER — LORAZEPAM 1 MG/1
1 TABLET ORAL 2 TIMES DAILY PRN
Status: ON HOLD | COMMUNITY
Start: 2021-05-24 | End: 2022-11-07 | Stop reason: ALTCHOICE

## 2021-08-06 NOTE — ASSESSMENT & PLAN NOTE
"· No anginal symptoms  · Nonexertional \"lightening bolt\" chest pains are not angina  · Continue aspirin, beta-blocker  · Encourage patient to resume statin therapy  · Encourage smoking cessation  "

## 2021-08-27 ENCOUNTER — APPOINTMENT (OUTPATIENT)
Dept: VACCINE CLINIC | Facility: HOSPITAL | Age: 71
End: 2021-08-27

## 2021-08-30 ENCOUNTER — IMMUNIZATION (OUTPATIENT)
Dept: VACCINE CLINIC | Facility: HOSPITAL | Age: 71
End: 2021-08-30

## 2021-08-30 PROCEDURE — 91300 HC SARSCOV02 VAC 30MCG/0.3ML IM: CPT | Performed by: INTERNAL MEDICINE

## 2021-08-30 PROCEDURE — 0002A: CPT | Performed by: INTERNAL MEDICINE

## 2021-10-28 ENCOUNTER — TELEPHONE (OUTPATIENT)
Dept: UROLOGY | Facility: CLINIC | Age: 71
End: 2021-10-28

## 2021-10-28 NOTE — TELEPHONE ENCOUNTER
Caller: MRS WOOTEN    Relationship to patient: SPOUSE    Best call back number: [phone]    Chief complaint: PT'S FIANCE WOULD LIKE TO RESCHEDULE IN OFFICE PROCEDURE FOR HIM THAT WAS ORIGINALLY SCHEDULED FOR 6/17/21    Type of visit: IN OFFICE PROCEDURE    Requested date: NEXT WEEK    If rescheduling, when is the original appointment: 06/17/2021     Additional notes: WOULD LIKE A CALLBACK IN THE MORNING

## 2022-03-24 ENCOUNTER — TELEPHONE (OUTPATIENT)
Dept: CARDIOLOGY | Facility: CLINIC | Age: 72
End: 2022-03-24

## 2022-03-24 NOTE — TELEPHONE ENCOUNTER
Patient's friend is calling for cardiac clearance for an upcoming circumcision 4/5/22. OK to clear and continue ASA?

## 2022-03-29 ENCOUNTER — TELEPHONE (OUTPATIENT)
Dept: CARDIOLOGY | Facility: CLINIC | Age: 72
End: 2022-03-29

## 2022-03-29 NOTE — TELEPHONE ENCOUNTER
SPoke with patient about upcoming surgery. He was told to call our office for lab work and EKG. Advised the patient he could have done at PCP. We have already provided clearance. Told patient to call back with fax number for urology office. Faxed to PCP per his request at [phone].

## 2022-07-28 ENCOUNTER — TRANSCRIBE ORDERS (OUTPATIENT)
Dept: ADMINISTRATIVE | Facility: HOSPITAL | Age: 72
End: 2022-07-28

## 2022-07-28 DIAGNOSIS — Z13.6 SCREENING FOR ISCHEMIC HEART DISEASE: Primary | ICD-10-CM

## 2022-08-10 ENCOUNTER — TRANSCRIBE ORDERS (OUTPATIENT)
Dept: ADMINISTRATIVE | Facility: HOSPITAL | Age: 72
End: 2022-08-10

## 2022-08-10 DIAGNOSIS — F17.210 CIGARETTE SMOKER: Primary | ICD-10-CM

## 2022-08-15 ENCOUNTER — HOSPITAL ENCOUNTER (OUTPATIENT)
Dept: ULTRASOUND IMAGING | Facility: HOSPITAL | Age: 72
Discharge: HOME | End: 2022-08-15
Admitting: NURSE PRACTITIONER

## 2022-08-15 DIAGNOSIS — Z13.6 SCREENING FOR ISCHEMIC HEART DISEASE: ICD-10-CM

## 2022-08-15 PROCEDURE — 76706 US ABDL AORTA SCREEN AAA: CPT | Performed by: RADIOLOGY

## 2022-08-15 PROCEDURE — 76706 US ABDL AORTA SCREEN AAA: CPT

## 2022-08-22 ENCOUNTER — TELEPHONE (OUTPATIENT)
Dept: CARDIOLOGY | Facility: CLINIC | Age: 72
End: 2022-08-22

## 2022-08-22 NOTE — TELEPHONE ENCOUNTER
Patient's friend, Tamiko called to report that he had a AAA screening at [facility]. AAA increased from 3.34 to 4.7 cm. 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 His PCP, Dr. Owens wants to refer him to CT surgery in [facility].    I advised her that he could return to normal activities and still drive currently. Typically refer to CT surgery at 5 cm.  Would it be ok with you to go ahead and refer to CT surgery here?

## 2022-09-08 NOTE — PROGRESS NOTES
[facility] Cardiology      Identification: Jose Nesbitt is a 71 y.o. male who resides in [place], [place]    Reason for visit:  Coronary artery disease involving native coronary artery of    Assessment     Problem List Items Addressed This Visit        Cardiac and Vasculature    Coronary artery disease involving native coronary artery of native heart with angina pectoris (HCC) - Primary (Chronic)    Overview     · Cardiac catheterization for NSTEMI by Travon Ramey (10/11/2016):  Ulcerated critical distal left main disease and multivessel CAD.  · CABG by Dr. Joe Hogue (10/12/2016):  LIMA to LAD, SVG to ramus, SVG to OM1  · Exercise nuclear stress (4/6/17): Exercised for 8.5 minutes. No ischemia/infarct. LVEF 67%.         Current Assessment & Plan     · No signs or symptoms of angina  · Continue aspirin 81 mg daily  · Continue metoprolol succinate 25 mg daily         Relevant Orders    ECG 12 Lead    PVC's (premature ventricular contractions)    Overview     · 48 hour Holter monitor (12/22/2017): Occasional PVCs and PACs.  No arrhythmias or pauses         Current Assessment & Plan     · Continue metoprolol succinate 25 mg daily         Abdominal aortic aneurysm (AAA) without rupture (HCC)    Overview     · Abdominal ultrasound (08/2018): 3.2 cm infrarenal abdominal aortic aneurysm.  · Abdominal ultrasound (01/24/2019): 3.2 cm infrarenal abdominal aortic aneurysm  · Abdominal US (07/10/2020): 3.5 cm abdominal aortic aneurysm.  · Abdominal US (6/8/2021):  3.4 cm infrarenal abdominal aortic aneurysm.  · Abdominal US (8/15/2022): Abdominal aortic aneurysm increased from 3.34 cm to 4.7 cm.         Current Assessment & Plan     · Follow-up with Dr. Hamm at already scheduled appointment this week         Hyperlipidemia LDL goal <70 (Chronic)    Overview     · High intensity statin therapy indicated given the presence coronary disease         Current Assessment & Plan     · Continue Crestor 20 mg daily             Tobacco    Tobacco abuse (Chronic)    Overview     · CT scan of the chest for lung cancer screening (7/25/2018):Chronic lung changes with upper lobe predominant emphysema and central bronchiectasis however no dominant pulmonary nodule. Lung RADS category 1 with continued recommended annual follow-up.             Patient has no signs or symptoms of angina or heart failure.  We will continue his current medications.  He should follow-up with Dr. Hamm for repeat CT scan and management of his abdominal aortic aneurysm.  We discussed the importance of smoking cessation but the patient is not willing to quit.  He will follow-up 6 months or sooner if needed  Plan   • Follow-up with Dr. Hamm for abdominal aortic aneurysm  • Continue current medications  • Recommend immediate smoking cessation      Follow-up   Return in about 6 months (around 3/12/2023), or if symptoms worsen or fail to improve, for Follow-up with Dr. Sawyer next visit.        Subjective      Patient is a 72 y/o gentleman who returns today for follow up of coronary artery disease, abdominal aortic aneurysm, PVC's and cardiac risk factors.  Patient contacted our office last month reporting a AAA screen showed his aneurysm had increased from 3.34 cm to 4.7 cm.  He was referred to Dr Hamm and has a repeat CT scan and appointment later this week.  The patient denies any chest pain, palpitations or dyspnea.  Overall he has no complaints today.  He is just very concerned over his abdominal aortic aneurysm and if he may need surgery.  The patient is a longtime smoker and continues to smoke.  He says he has tried to quit and is just unable to.    Review of Systems   Constitutional: Negative for malaise/fatigue.   Eyes: Negative for vision loss in left eye and vision loss in right eye.   Cardiovascular: Negative for chest pain, dyspnea on exertion, near-syncope, orthopnea, palpitations, paroxysmal nocturnal dyspnea and syncope.   Musculoskeletal: Negative for  "myalgias.   Neurological: Negative for brief paralysis, excessive daytime sleepiness, focal weakness, numbness, paresthesias and weakness.   All other systems reviewed and are negative.      Objective     /60 (BP Location: Left arm, Patient Position: Sitting, Cuff Size: Adult)   Pulse 72   Ht 180.3 cm (71\")   Wt 88.5 kg (195 lb)   SpO2 95%   BMI 27.20 kg/m²       Constitutional:       Appearance: Healthy appearance. Well-developed.   Eyes:      General: Lids are normal. No scleral icterus.     Conjunctiva/sclera: Conjunctivae normal.   HENT:      Head: Normocephalic and atraumatic.   Neck:      Thyroid: No thyromegaly.      Vascular: No carotid bruit or JVD.   Pulmonary:      Effort: Pulmonary effort is normal.      Breath sounds: Normal breath sounds. No wheezing. No rhonchi. No rales.   Cardiovascular:      Normal rate. Regular rhythm.      Murmurs: There is no murmur.      No gallop. No rub.   Pulses:     Intact distal pulses.   Edema:     Peripheral edema absent.   Abdominal:      General: There is no distension.      Palpations: Abdomen is soft. There is no abdominal mass.   Musculoskeletal:      Cervical back: Normal range of motion. Skin:     General: Skin is warm and dry.      Findings: No rash.   Neurological:      General: No focal deficit present.      Mental Status: Alert and oriented to person, place, and time.      Gait: Gait is intact.   Psychiatric:         Attention and Perception: Attention normal.         Mood and Affect: Mood normal.         Behavior: Behavior normal.         Result Review  (reviewed with patient):        ECG 12 Lead    Date/Time: 9/12/2022 12:41 PM  Performed by: Yuni Mccarthy APRN  Authorized by: Yuni Mccarthy APRN   Comparison: compared with previous ECG from 8/6/2021  Similar to previous ECG  Comparison to previous ECG: No change from prior EKG  Rhythm: sinus rhythm  BPM: 61  Conduction: 1st degree AV block    Clinical impression: normal ECG  Comments: " QT/QTc 404/406 MS             Lab Results   Component Value Date    GLUCOSE 108 (H) 10/18/2016    BUN 25 (H) 10/18/2016    CREATININE 0.90 08/12/2019    EGFRIFNONA 114 10/18/2016    BCR 35.7 (H) 10/18/2016    K 3.6 10/18/2016    CO2 32.0 (H) 10/18/2016    CALCIUM 8.9 10/18/2016    ALBUMIN 4.00 10/13/2016    AST 50 (H) 10/12/2016    ALT 35 10/12/2016     Lab Results   Component Value Date    WBC 7.57 10/17/2016    HGB 10.2 (L) 10/17/2016    HCT 31.3 (L) 10/17/2016    MCV 87.9 10/17/2016     10/17/2016     Lab Results   Component Value Date    CHOL 204 (H) 10/12/2016    TRIG 147 10/12/2016    HDL 40 10/12/2016     (H) 10/12/2016     Lab Results   Component Value Date    HGBA1C 6.00 10/12/2016         Yuni Mccarthy APRN  9/12/2022

## 2022-09-12 ENCOUNTER — OFFICE VISIT (OUTPATIENT)
Dept: CARDIOLOGY | Facility: CLINIC | Age: 72
End: 2022-09-12

## 2022-09-12 VITALS
SYSTOLIC BLOOD PRESSURE: 126 MMHG | HEART RATE: 72 BPM | HEIGHT: 71 IN | DIASTOLIC BLOOD PRESSURE: 60 MMHG | OXYGEN SATURATION: 95 % | WEIGHT: 195 LBS | BODY MASS INDEX: 27.3 KG/M2

## 2022-09-12 DIAGNOSIS — I49.3 PVC'S (PREMATURE VENTRICULAR CONTRACTIONS): ICD-10-CM

## 2022-09-12 DIAGNOSIS — I71.40 ABDOMINAL AORTIC ANEURYSM (AAA) WITHOUT RUPTURE: ICD-10-CM

## 2022-09-12 DIAGNOSIS — E78.5 HYPERLIPIDEMIA LDL GOAL <70: Chronic | ICD-10-CM

## 2022-09-12 DIAGNOSIS — Z72.0 TOBACCO ABUSE: Chronic | ICD-10-CM

## 2022-09-12 DIAGNOSIS — I25.119 CORONARY ARTERY DISEASE INVOLVING NATIVE CORONARY ARTERY OF NATIVE HEART WITH ANGINA PECTORIS: Primary | Chronic | ICD-10-CM

## 2022-09-12 PROCEDURE — 99214 OFFICE O/P EST MOD 30 MIN: CPT | Performed by: NURSE PRACTITIONER

## 2022-09-12 PROCEDURE — 93000 ELECTROCARDIOGRAM COMPLETE: CPT | Performed by: NURSE PRACTITIONER

## 2022-09-12 NOTE — ASSESSMENT & PLAN NOTE
· No signs or symptoms of angina  · Continue aspirin 81 mg daily  · Continue metoprolol succinate 25 mg daily

## 2022-09-15 ENCOUNTER — APPOINTMENT (OUTPATIENT)
Dept: CT IMAGING | Facility: HOSPITAL | Age: 72
End: 2022-09-15

## 2022-11-04 ENCOUNTER — PRE-ADMISSION TESTING (OUTPATIENT)
Dept: PREADMISSION TESTING | Facility: HOSPITAL | Age: 72
End: 2022-11-04

## 2022-11-04 VITALS — WEIGHT: 195.44 LBS | HEIGHT: 71 IN | BODY MASS INDEX: 27.36 KG/M2

## 2022-11-04 LAB
ANION GAP SERPL CALCULATED.3IONS-SCNC: 11 MMOL/L (ref 5–15)
BUN SERPL-MCNC: 21 MG/DL (ref 8–23)
BUN/CREAT SERPL: 22.8 (ref 7–25)
CALCIUM SPEC-SCNC: 9.1 MG/DL (ref 8.6–10.5)
CHLORIDE SERPL-SCNC: 105 MMOL/L (ref 98–107)
CO2 SERPL-SCNC: 25 MMOL/L (ref 22–29)
CREAT SERPL-MCNC: 0.92 MG/DL (ref 0.76–1.27)
DEPRECATED RDW RBC AUTO: 50.8 FL (ref 37–54)
EGFRCR SERPLBLD CKD-EPI 2021: 89.5 ML/MIN/1.73
ERYTHROCYTE [DISTWIDTH] IN BLOOD BY AUTOMATED COUNT: 19 % (ref 12.3–15.4)
GLUCOSE SERPL-MCNC: 157 MG/DL (ref 65–99)
HBA1C MFR BLD: 6.1 % (ref 4.8–5.6)
HCT VFR BLD AUTO: 43.8 % (ref 37.5–51)
HGB BLD-MCNC: 13.3 G/DL (ref 13–17.7)
MCH RBC QN AUTO: 23.7 PG (ref 26.6–33)
MCHC RBC AUTO-ENTMCNC: 30.4 G/DL (ref 31.5–35.7)
MCV RBC AUTO: 78.1 FL (ref 79–97)
PLATELET # BLD AUTO: 183 10*3/MM3 (ref 140–450)
PMV BLD AUTO: 10 FL (ref 6–12)
POTASSIUM SERPL-SCNC: 4.2 MMOL/L (ref 3.5–5.2)
QT INTERVAL: 400 MS
QTC INTERVAL: 461 MS
RBC # BLD AUTO: 5.61 10*6/MM3 (ref 4.14–5.8)
SODIUM SERPL-SCNC: 141 MMOL/L (ref 136–145)
WBC NRBC COR # BLD: 6.34 10*3/MM3 (ref 3.4–10.8)

## 2022-11-04 PROCEDURE — 80048 BASIC METABOLIC PNL TOTAL CA: CPT

## 2022-11-04 PROCEDURE — 93005 ELECTROCARDIOGRAM TRACING: CPT

## 2022-11-04 PROCEDURE — 85027 COMPLETE CBC AUTOMATED: CPT

## 2022-11-04 PROCEDURE — 83036 HEMOGLOBIN GLYCOSYLATED A1C: CPT

## 2022-11-04 PROCEDURE — 36415 COLL VENOUS BLD VENIPUNCTURE: CPT

## 2022-11-04 PROCEDURE — 93010 ELECTROCARDIOGRAM REPORT: CPT | Performed by: INTERNAL MEDICINE

## 2022-11-04 RX ORDER — DIAZEPAM 5 MG/1
5 TABLET ORAL 2 TIMES DAILY PRN
COMMUNITY

## 2022-11-04 NOTE — PAT
Patient to apply Chlorhexadine wipes  to surgical area (as instructed) the night before procedure and the AM of procedure. Wipes provided.    Consent signed in PAT.    Patient denies chest pain or SOA since last seeing Dr. Sawyer/Cynthia Mccarthy.

## 2022-11-07 ENCOUNTER — ANESTHESIA EVENT (OUTPATIENT)
Dept: PERIOP | Facility: HOSPITAL | Age: 72
End: 2022-11-07

## 2022-11-07 ENCOUNTER — APPOINTMENT (OUTPATIENT)
Dept: GENERAL RADIOLOGY | Facility: HOSPITAL | Age: 72
End: 2022-11-07

## 2022-11-07 ENCOUNTER — ANESTHESIA EVENT CONVERTED (OUTPATIENT)
Dept: ANESTHESIOLOGY | Facility: HOSPITAL | Age: 72
End: 2022-11-07

## 2022-11-07 ENCOUNTER — ANESTHESIA (OUTPATIENT)
Dept: PERIOP | Facility: HOSPITAL | Age: 72
End: 2022-11-07

## 2022-11-07 ENCOUNTER — HOSPITAL ENCOUNTER (INPATIENT)
Facility: HOSPITAL | Age: 72
LOS: 1 days | Discharge: HOME | End: 2022-11-08
Attending: SURGERY | Admitting: SURGERY

## 2022-11-07 DIAGNOSIS — I71.43 INFRARENAL ABDOMINAL AORTIC ANEURYSM (AAA) WITHOUT RUPTURE: Primary | ICD-10-CM

## 2022-11-07 PROBLEM — I71.40 AAA (ABDOMINAL AORTIC ANEURYSM) (HCC): Status: ACTIVE | Noted: 2022-11-07

## 2022-11-07 PROBLEM — I71.9 AORTIC ANEURYSM WITHOUT RUPTURE (HCC): Status: RESOLVED | Noted: 2022-11-07 | Resolved: 2022-11-07

## 2022-11-07 PROBLEM — I71.9 AORTIC ANEURYSM WITHOUT RUPTURE: Status: ACTIVE | Noted: 2022-11-07

## 2022-11-07 LAB
ABO GROUP BLD: NORMAL
BLD GP AB SCN SERPL QL: NEGATIVE
GLUCOSE BLDC GLUCOMTR-MCNC: 122 MG/DL (ref 70–130)
RH BLD: POSITIVE
T&S EXPIRATION DATE: NORMAL

## 2022-11-07 PROCEDURE — 25010000002 HEPARIN (PORCINE) PER 1000 UNITS: Performed by: ANESTHESIOLOGY

## 2022-11-07 PROCEDURE — C1769 GUIDE WIRE: HCPCS | Performed by: SURGERY

## 2022-11-07 PROCEDURE — C1894 INTRO/SHEATH, NON-LASER: HCPCS | Performed by: SURGERY

## 2022-11-07 PROCEDURE — 25010000002 PROTAMINE SULFATE PER 10 MG: Performed by: ANESTHESIOLOGY

## 2022-11-07 PROCEDURE — 25010000002 PROPOFOL 10 MG/ML EMULSION: Performed by: ANESTHESIOLOGY

## 2022-11-07 PROCEDURE — 86901 BLOOD TYPING SEROLOGIC RH(D): CPT | Performed by: SURGERY

## 2022-11-07 PROCEDURE — 25010000002 HEPARIN (PORCINE) PER 1000 UNITS: Performed by: SURGERY

## 2022-11-07 PROCEDURE — C1889 IMPLANT/INSERT DEVICE, NOC: HCPCS | Performed by: SURGERY

## 2022-11-07 PROCEDURE — 99222 1ST HOSP IP/OBS MODERATE 55: CPT | Performed by: INTERNAL MEDICINE

## 2022-11-07 PROCEDURE — 25010000002 ONDANSETRON PER 1 MG: Performed by: ANESTHESIOLOGY

## 2022-11-07 PROCEDURE — 86850 RBC ANTIBODY SCREEN: CPT | Performed by: SURGERY

## 2022-11-07 PROCEDURE — 82962 GLUCOSE BLOOD TEST: CPT

## 2022-11-07 PROCEDURE — C1874 STENT, COATED/COV W/DEL SYS: HCPCS | Performed by: SURGERY

## 2022-11-07 PROCEDURE — 25010000002 PHENYLEPHRINE 10 MG/ML SOLUTION: Performed by: ANESTHESIOLOGY

## 2022-11-07 PROCEDURE — C2628 CATHETER, OCCLUSION: HCPCS | Performed by: SURGERY

## 2022-11-07 PROCEDURE — 25010000002 FENTANYL CITRATE (PF) 100 MCG/2ML SOLUTION: Performed by: ANESTHESIOLOGY

## 2022-11-07 PROCEDURE — C1760 CLOSURE DEV, VASC: HCPCS | Performed by: SURGERY

## 2022-11-07 PROCEDURE — 04V03DZ RESTRICTION OF ABDOMINAL AORTA WITH INTRALUMINAL DEVICE, PERCUTANEOUS APPROACH: ICD-10-PCS | Performed by: SURGERY

## 2022-11-07 PROCEDURE — 25010000002 CEFAZOLIN IN DEXTROSE 2-4 GM/100ML-% SOLUTION: Performed by: SURGERY

## 2022-11-07 PROCEDURE — 25010000002 DEXAMETHASONE PER 1 MG: Performed by: ANESTHESIOLOGY

## 2022-11-07 PROCEDURE — 86923 COMPATIBILITY TEST ELECTRIC: CPT

## 2022-11-07 PROCEDURE — 86900 BLOOD TYPING SEROLOGIC ABO: CPT | Performed by: SURGERY

## 2022-11-07 PROCEDURE — 25010000002 HYDROMORPHONE PER 4 MG: Performed by: SURGERY

## 2022-11-07 DEVICE — GRFT EXCLDR CONTRALAT 14.5MMX10CM: Type: IMPLANTABLE DEVICE | Site: AORTA | Status: FUNCTIONAL

## 2022-11-07 DEVICE — GRFT EXCLDR CONTRALAT 12F 16MM 13.5CM: Type: IMPLANTABLE DEVICE | Site: AORTA | Status: FUNCTIONAL

## 2022-11-07 DEVICE — STENTGR ENDOPRSTH AAA EXCLUDER I/LAT TRNK 18F 14.5X32MM 14CM: Type: IMPLANTABLE DEVICE | Site: AORTA | Status: FUNCTIONAL

## 2022-11-07 RX ORDER — ACETAMINOPHEN 500 MG
1000 TABLET ORAL EVERY 8 HOURS
Status: DISCONTINUED | OUTPATIENT
Start: 2022-11-07 | End: 2022-11-08 | Stop reason: HOSPADM

## 2022-11-07 RX ORDER — MAGNESIUM HYDROXIDE 1200 MG/15ML
LIQUID ORAL AS NEEDED
Status: DISCONTINUED | OUTPATIENT
Start: 2022-11-07 | End: 2022-11-07 | Stop reason: HOSPADM

## 2022-11-07 RX ORDER — LIDOCAINE HYDROCHLORIDE 10 MG/ML
INJECTION, SOLUTION EPIDURAL; INFILTRATION; INTRACAUDAL; PERINEURAL AS NEEDED
Status: DISCONTINUED | OUTPATIENT
Start: 2022-11-07 | End: 2022-11-07 | Stop reason: SURG

## 2022-11-07 RX ORDER — AMOXICILLIN 250 MG
2 CAPSULE ORAL 2 TIMES DAILY PRN
Status: DISCONTINUED | OUTPATIENT
Start: 2022-11-07 | End: 2022-11-08 | Stop reason: HOSPADM

## 2022-11-07 RX ORDER — DEXAMETHASONE SODIUM PHOSPHATE 4 MG/ML
INJECTION, SOLUTION INTRA-ARTICULAR; INTRALESIONAL; INTRAMUSCULAR; INTRAVENOUS; SOFT TISSUE AS NEEDED
Status: DISCONTINUED | OUTPATIENT
Start: 2022-11-07 | End: 2022-11-07 | Stop reason: SURG

## 2022-11-07 RX ORDER — MIDAZOLAM HYDROCHLORIDE 1 MG/ML
0.5 INJECTION INTRAMUSCULAR; INTRAVENOUS
Status: DISCONTINUED | OUTPATIENT
Start: 2022-11-07 | End: 2022-11-07 | Stop reason: HOSPADM

## 2022-11-07 RX ORDER — HYDROMORPHONE HYDROCHLORIDE 1 MG/ML
0.5 INJECTION, SOLUTION INTRAMUSCULAR; INTRAVENOUS; SUBCUTANEOUS
Status: DISCONTINUED | OUTPATIENT
Start: 2022-11-07 | End: 2022-11-08 | Stop reason: HOSPADM

## 2022-11-07 RX ORDER — EPHEDRINE SULFATE 50 MG/ML
5 INJECTION, SOLUTION INTRAVENOUS ONCE AS NEEDED
Status: DISCONTINUED | OUTPATIENT
Start: 2022-11-07 | End: 2022-11-08 | Stop reason: HOSPADM

## 2022-11-07 RX ORDER — CEFAZOLIN SODIUM IN 0.9 % NACL 2 G/100 ML
2 PLASTIC BAG, INJECTION (ML) INTRAVENOUS ONCE
Status: COMPLETED | OUTPATIENT
Start: 2022-11-07 | End: 2022-11-07

## 2022-11-07 RX ORDER — ONDANSETRON 4 MG/1
4 TABLET, FILM COATED ORAL EVERY 6 HOURS PRN
Status: DISCONTINUED | OUTPATIENT
Start: 2022-11-07 | End: 2022-11-08 | Stop reason: HOSPADM

## 2022-11-07 RX ORDER — CEFAZOLIN SODIUM 2 G/100ML
2 INJECTION, SOLUTION INTRAVENOUS EVERY 8 HOURS
Status: COMPLETED | OUTPATIENT
Start: 2022-11-07 | End: 2022-11-08

## 2022-11-07 RX ORDER — NALOXONE HCL 0.4 MG/ML
0.4 VIAL (ML) INJECTION AS NEEDED
Status: DISCONTINUED | OUTPATIENT
Start: 2022-11-07 | End: 2022-11-08 | Stop reason: HOSPADM

## 2022-11-07 RX ORDER — PANTOPRAZOLE SODIUM 40 MG/1
40 TABLET, DELAYED RELEASE ORAL EVERY MORNING
Status: DISCONTINUED | OUTPATIENT
Start: 2022-11-07 | End: 2022-11-08 | Stop reason: HOSPADM

## 2022-11-07 RX ORDER — FINASTERIDE 5 MG/1
5 TABLET, FILM COATED ORAL DAILY
Status: DISCONTINUED | OUTPATIENT
Start: 2022-11-07 | End: 2022-11-08 | Stop reason: HOSPADM

## 2022-11-07 RX ORDER — HYDRALAZINE HYDROCHLORIDE 20 MG/ML
10 INJECTION INTRAMUSCULAR; INTRAVENOUS EVERY 4 HOURS PRN
Status: DISCONTINUED | OUTPATIENT
Start: 2022-11-07 | End: 2022-11-08 | Stop reason: HOSPADM

## 2022-11-07 RX ORDER — PROTAMINE SULFATE 10 MG/ML
INJECTION, SOLUTION INTRAVENOUS AS NEEDED
Status: DISCONTINUED | OUTPATIENT
Start: 2022-11-07 | End: 2022-11-07 | Stop reason: SURG

## 2022-11-07 RX ORDER — METOPROLOL SUCCINATE 25 MG/1
25 TABLET, EXTENDED RELEASE ORAL DAILY
Status: DISCONTINUED | OUTPATIENT
Start: 2022-11-08 | End: 2022-11-08 | Stop reason: HOSPADM

## 2022-11-07 RX ORDER — SODIUM CHLORIDE, SODIUM LACTATE, POTASSIUM CHLORIDE, CALCIUM CHLORIDE 600; 310; 30; 20 MG/100ML; MG/100ML; MG/100ML; MG/100ML
INJECTION, SOLUTION INTRAVENOUS CONTINUOUS PRN
Status: DISCONTINUED | OUTPATIENT
Start: 2022-11-07 | End: 2022-11-07 | Stop reason: SURG

## 2022-11-07 RX ORDER — ONDANSETRON 2 MG/ML
4 INJECTION INTRAMUSCULAR; INTRAVENOUS EVERY 6 HOURS PRN
Status: DISCONTINUED | OUTPATIENT
Start: 2022-11-07 | End: 2022-11-08 | Stop reason: HOSPADM

## 2022-11-07 RX ORDER — FENTANYL CITRATE 50 UG/ML
50 INJECTION, SOLUTION INTRAMUSCULAR; INTRAVENOUS
Status: DISCONTINUED | OUTPATIENT
Start: 2022-11-07 | End: 2022-11-08 | Stop reason: HOSPADM

## 2022-11-07 RX ORDER — PROPOFOL 10 MG/ML
VIAL (ML) INTRAVENOUS AS NEEDED
Status: DISCONTINUED | OUTPATIENT
Start: 2022-11-07 | End: 2022-11-07 | Stop reason: SURG

## 2022-11-07 RX ORDER — SODIUM CHLORIDE 0.9 % (FLUSH) 0.9 %
10 SYRINGE (ML) INJECTION EVERY 12 HOURS SCHEDULED
Status: DISCONTINUED | OUTPATIENT
Start: 2022-11-07 | End: 2022-11-07 | Stop reason: HOSPADM

## 2022-11-07 RX ORDER — SODIUM CHLORIDE, SODIUM LACTATE, POTASSIUM CHLORIDE, CALCIUM CHLORIDE 600; 310; 30; 20 MG/100ML; MG/100ML; MG/100ML; MG/100ML
100 INJECTION, SOLUTION INTRAVENOUS CONTINUOUS
Status: ACTIVE | OUTPATIENT
Start: 2022-11-07 | End: 2022-11-08

## 2022-11-07 RX ORDER — NALOXONE HCL 0.4 MG/ML
0.4 VIAL (ML) INJECTION
Status: DISCONTINUED | OUTPATIENT
Start: 2022-11-07 | End: 2022-11-08 | Stop reason: HOSPADM

## 2022-11-07 RX ORDER — TAMSULOSIN HYDROCHLORIDE 0.4 MG/1
0.4 CAPSULE ORAL DAILY
Status: DISCONTINUED | OUTPATIENT
Start: 2022-11-07 | End: 2022-11-08 | Stop reason: HOSPADM

## 2022-11-07 RX ORDER — PHENYLEPHRINE HCL IN 0.9% NACL 1 MG/10 ML
SYRINGE (ML) INTRAVENOUS AS NEEDED
Status: DISCONTINUED | OUTPATIENT
Start: 2022-11-07 | End: 2022-11-07 | Stop reason: SURG

## 2022-11-07 RX ORDER — SODIUM CHLORIDE, SODIUM LACTATE, POTASSIUM CHLORIDE, CALCIUM CHLORIDE 600; 310; 30; 20 MG/100ML; MG/100ML; MG/100ML; MG/100ML
100 INJECTION, SOLUTION INTRAVENOUS CONTINUOUS
Status: DISCONTINUED | OUTPATIENT
Start: 2022-11-07 | End: 2022-11-08 | Stop reason: HOSPADM

## 2022-11-07 RX ORDER — HEPARIN SODIUM 1000 [USP'U]/ML
INJECTION, SOLUTION INTRAVENOUS; SUBCUTANEOUS AS NEEDED
Status: DISCONTINUED | OUTPATIENT
Start: 2022-11-07 | End: 2022-11-07 | Stop reason: SURG

## 2022-11-07 RX ORDER — PHENYLEPHRINE HYDROCHLORIDE 10 MG/ML
INJECTION INTRAVENOUS AS NEEDED
Status: DISCONTINUED | OUTPATIENT
Start: 2022-11-07 | End: 2022-11-07 | Stop reason: SURG

## 2022-11-07 RX ORDER — ROSUVASTATIN CALCIUM 20 MG/1
20 TABLET, COATED ORAL NIGHTLY
Status: DISCONTINUED | OUTPATIENT
Start: 2022-11-07 | End: 2022-11-08 | Stop reason: HOSPADM

## 2022-11-07 RX ORDER — EPHEDRINE SULFATE 50 MG/ML
INJECTION INTRAVENOUS AS NEEDED
Status: DISCONTINUED | OUTPATIENT
Start: 2022-11-07 | End: 2022-11-07 | Stop reason: SURG

## 2022-11-07 RX ORDER — FAMOTIDINE 10 MG/ML
20 INJECTION, SOLUTION INTRAVENOUS ONCE
Status: DISCONTINUED | OUTPATIENT
Start: 2022-11-07 | End: 2022-11-07 | Stop reason: HOSPADM

## 2022-11-07 RX ORDER — ONDANSETRON 2 MG/ML
4 INJECTION INTRAMUSCULAR; INTRAVENOUS ONCE AS NEEDED
Status: DISCONTINUED | OUTPATIENT
Start: 2022-11-07 | End: 2022-11-08 | Stop reason: HOSPADM

## 2022-11-07 RX ORDER — ENOXAPARIN SODIUM 100 MG/ML
40 INJECTION SUBCUTANEOUS DAILY
Status: DISCONTINUED | OUTPATIENT
Start: 2022-11-08 | End: 2022-11-08 | Stop reason: HOSPADM

## 2022-11-07 RX ORDER — SODIUM CHLORIDE 0.9 % (FLUSH) 0.9 %
10 SYRINGE (ML) INJECTION AS NEEDED
Status: DISCONTINUED | OUTPATIENT
Start: 2022-11-07 | End: 2022-11-07 | Stop reason: HOSPADM

## 2022-11-07 RX ORDER — FAMOTIDINE 20 MG/1
20 TABLET, FILM COATED ORAL ONCE
Status: COMPLETED | OUTPATIENT
Start: 2022-11-07 | End: 2022-11-07

## 2022-11-07 RX ORDER — DIAZEPAM 5 MG/1
5 TABLET ORAL 2 TIMES DAILY PRN
Status: DISCONTINUED | OUTPATIENT
Start: 2022-11-07 | End: 2022-11-08 | Stop reason: HOSPADM

## 2022-11-07 RX ORDER — LIDOCAINE HYDROCHLORIDE 10 MG/ML
0.5 INJECTION, SOLUTION EPIDURAL; INFILTRATION; INTRACAUDAL; PERINEURAL ONCE AS NEEDED
Status: COMPLETED | OUTPATIENT
Start: 2022-11-07 | End: 2022-11-07

## 2022-11-07 RX ORDER — FENTANYL CITRATE 50 UG/ML
INJECTION, SOLUTION INTRAMUSCULAR; INTRAVENOUS AS NEEDED
Status: DISCONTINUED | OUTPATIENT
Start: 2022-11-07 | End: 2022-11-07 | Stop reason: SURG

## 2022-11-07 RX ORDER — BUPIVACAINE HYDROCHLORIDE 5 MG/ML
INJECTION, SOLUTION PERINEURAL AS NEEDED
Status: DISCONTINUED | OUTPATIENT
Start: 2022-11-07 | End: 2022-11-07 | Stop reason: HOSPADM

## 2022-11-07 RX ORDER — ASPIRIN 81 MG/1
81 TABLET ORAL DAILY
Status: DISCONTINUED | OUTPATIENT
Start: 2022-11-08 | End: 2022-11-08 | Stop reason: HOSPADM

## 2022-11-07 RX ORDER — SODIUM CHLORIDE, SODIUM LACTATE, POTASSIUM CHLORIDE, CALCIUM CHLORIDE 600; 310; 30; 20 MG/100ML; MG/100ML; MG/100ML; MG/100ML
9 INJECTION, SOLUTION INTRAVENOUS CONTINUOUS
Status: DISCONTINUED | OUTPATIENT
Start: 2022-11-07 | End: 2022-11-07

## 2022-11-07 RX ORDER — ROCURONIUM BROMIDE 10 MG/ML
INJECTION, SOLUTION INTRAVENOUS AS NEEDED
Status: DISCONTINUED | OUTPATIENT
Start: 2022-11-07 | End: 2022-11-07 | Stop reason: SURG

## 2022-11-07 RX ORDER — ONDANSETRON 2 MG/ML
INJECTION INTRAMUSCULAR; INTRAVENOUS AS NEEDED
Status: DISCONTINUED | OUTPATIENT
Start: 2022-11-07 | End: 2022-11-07 | Stop reason: SURG

## 2022-11-07 RX ADMIN — Medication 100 MCG: at 07:59

## 2022-11-07 RX ADMIN — PROTAMINE SULFATE 20 MG: 10 INJECTION, SOLUTION INTRAVENOUS at 09:16

## 2022-11-07 RX ADMIN — CEFAZOLIN 2 G: 10 INJECTION, POWDER, FOR SOLUTION INTRAVENOUS at 08:06

## 2022-11-07 RX ADMIN — ONDANSETRON 4 MG: 2 INJECTION INTRAMUSCULAR; INTRAVENOUS at 09:02

## 2022-11-07 RX ADMIN — SODIUM CHLORIDE, POTASSIUM CHLORIDE, SODIUM LACTATE AND CALCIUM CHLORIDE 100 ML/HR: 600; 310; 30; 20 INJECTION, SOLUTION INTRAVENOUS at 22:27

## 2022-11-07 RX ADMIN — PROTAMINE SULFATE 20 MG: 10 INJECTION, SOLUTION INTRAVENOUS at 09:14

## 2022-11-07 RX ADMIN — PROPOFOL 150 MG: 10 INJECTION, EMULSION INTRAVENOUS at 07:59

## 2022-11-07 RX ADMIN — PHENYLEPHRINE HYDROCHLORIDE 100 MCG: 10 INJECTION INTRAVENOUS at 08:53

## 2022-11-07 RX ADMIN — CEFAZOLIN SODIUM 2 G: 2 INJECTION, SOLUTION INTRAVENOUS at 15:06

## 2022-11-07 RX ADMIN — FENTANYL CITRATE 100 MCG: 50 INJECTION, SOLUTION INTRAMUSCULAR; INTRAVENOUS at 07:59

## 2022-11-07 RX ADMIN — SODIUM CHLORIDE, POTASSIUM CHLORIDE, SODIUM LACTATE AND CALCIUM CHLORIDE: 600; 310; 30; 20 INJECTION, SOLUTION INTRAVENOUS at 07:59

## 2022-11-07 RX ADMIN — SODIUM CHLORIDE, POTASSIUM CHLORIDE, SODIUM LACTATE AND CALCIUM CHLORIDE 100 ML/HR: 600; 310; 30; 20 INJECTION, SOLUTION INTRAVENOUS at 12:14

## 2022-11-07 RX ADMIN — HYDROMORPHONE HYDROCHLORIDE 0.5 MG: 1 INJECTION, SOLUTION INTRAMUSCULAR; INTRAVENOUS; SUBCUTANEOUS at 12:14

## 2022-11-07 RX ADMIN — ROCURONIUM BROMIDE 50 MG: 10 INJECTION INTRAVENOUS at 08:00

## 2022-11-07 RX ADMIN — DEXAMETHASONE SODIUM PHOSPHATE 4 MG: 4 INJECTION, SOLUTION INTRAMUSCULAR; INTRAVENOUS at 08:06

## 2022-11-07 RX ADMIN — FAMOTIDINE 20 MG: 20 TABLET ORAL at 07:39

## 2022-11-07 RX ADMIN — Medication 100 MCG: at 08:19

## 2022-11-07 RX ADMIN — PANTOPRAZOLE SODIUM 40 MG: 40 TABLET, DELAYED RELEASE ORAL at 12:14

## 2022-11-07 RX ADMIN — LIDOCAINE HYDROCHLORIDE 0.5 ML: 10 INJECTION, SOLUTION EPIDURAL; INFILTRATION; INTRACAUDAL; PERINEURAL at 07:39

## 2022-11-07 RX ADMIN — EPHEDRINE SULFATE 5 MG: 50 INJECTION INTRAVENOUS at 08:45

## 2022-11-07 RX ADMIN — ACETAMINOPHEN 1000 MG: 500 TABLET ORAL at 21:00

## 2022-11-07 RX ADMIN — LIDOCAINE HYDROCHLORIDE 50 MG: 10 INJECTION, SOLUTION EPIDURAL; INFILTRATION; INTRACAUDAL; PERINEURAL at 07:59

## 2022-11-07 RX ADMIN — PHENYLEPHRINE HYDROCHLORIDE 100 MCG: 10 INJECTION INTRAVENOUS at 08:58

## 2022-11-07 RX ADMIN — HEPARIN SODIUM 7000 UNITS: 1000 INJECTION, SOLUTION INTRAVENOUS; SUBCUTANEOUS at 08:32

## 2022-11-07 RX ADMIN — SODIUM CHLORIDE, POTASSIUM CHLORIDE, SODIUM LACTATE AND CALCIUM CHLORIDE 9 ML/HR: 600; 310; 30; 20 INJECTION, SOLUTION INTRAVENOUS at 07:39

## 2022-11-07 RX ADMIN — Medication 100 MCG: at 09:09

## 2022-11-07 RX ADMIN — PHENYLEPHRINE HYDROCHLORIDE 150 MCG: 10 INJECTION INTRAVENOUS at 09:14

## 2022-11-07 RX ADMIN — SUGAMMADEX 200 MG: 100 INJECTION, SOLUTION INTRAVENOUS at 09:13

## 2022-11-07 RX ADMIN — EPHEDRINE SULFATE 5 MG: 50 INJECTION INTRAVENOUS at 08:39

## 2022-11-07 RX ADMIN — Medication 100 MCG: at 08:27

## 2022-11-07 RX ADMIN — Medication 100 MCG: at 08:12

## 2022-11-07 RX ADMIN — Medication 100 MCG: at 08:22

## 2022-11-07 RX ADMIN — ACETAMINOPHEN 1000 MG: 500 TABLET ORAL at 15:06

## 2022-11-07 NOTE — PROGRESS NOTES
Intensive Care Admission Note     Chief Complaint:  AAA (abdominal aortic aneurysm)    History of Present Illness     Mr. Nesbitt is a 73 yo male with PMH known AAA, CAD s/p CABG x 3, Tobacco abuse, HLD, GERD, BPH, Aortic valve sclerosis, NSTEMI, PAF, who presents to ICU s/p EVAR with Dr. Harry. Patient with known history of AAA. Follow up scan showed increase in size to 5 cm so he was referred to Dr. Hamm for surgical evaluation. He was felt to be a good EVAR candidate so he presented to hospital today for procedure. Post op he presents to ICU for management.     I spent 5 minutes of time on this.  Lanny Vora, MSN, AGACNP-BC, APRN    Electronically signed by SULY Roblero, 11/07/22, 12:54 PM EST.    Attending attestation:  On arrival to the ICU patient is hemodynamically stable.  Denies chest pain, nausea, fever, or chills.  He has no other acute complaints.      Problem List, Surgical History, Family, Social History, and ROS     Patient Active Problem List    Diagnosis    • *AAA (abdominal aortic aneurysm) [I71.40]    • Abdominal aortic aneurysm (AAA) without rupture (HCC) [I71.40]    • PVC's (premature ventricular contractions) [I49.3]    • Aortic valve sclerosis [I35.8]    • Hyperlipidemia LDL goal <70 [E78.5]    • Coronary artery disease involving native coronary artery of native heart with angina pectoris (HCC) [I25.119]    • Tobacco abuse [Z72.0]    • GERD (gastroesophageal reflux disease) [K21.9]    • BPH (benign prostatic hyperplasia) [N40.0]    • Low back pain [M54.50]      Past Surgical History:   Procedure Laterality Date   • CARDIAC CATHETERIZATION N/A 10/11/2016    Procedure: Left Heart Cath;  Surgeon: Travon Ramey DO;  Location:  [facility];  Service:    • COLONOSCOPY     • CORONARY ARTERY BYPASS GRAFT N/A 10/12/2016    Procedure: ROSALINDA PER ANESTHESIA, MEDIAN STERNOTOMY, CORONARY ARTERY BYPASS GRAFT X  3, UTILIZING THE LEFT INTERNAL MAMMARY ARTERY AND EVH OF  RIGHT GREATER SAPHENOUS VEIN;  Surgeon: Joe Hogue MD;  Location: [facility];  Service:    • CYST REMOVAL      FROM CHEST, APPROX 20 YEARS AGO   • HERNIA REPAIR      APPROX 30 YEARS AGO       No Known Allergies  No current facility-administered medications on file prior to encounter.     Current Outpatient Medications on File Prior to Encounter   Medication Sig   • aspirin 81 MG EC tablet Take 1 tablet by mouth Daily.   • esomeprazole (NexIUM) 40 MG capsule Take 40 mg by mouth As Needed.   • metoprolol succinate XL (TOPROL-XL) 25 MG 24 hr tablet Take 1 tablet by mouth Daily.   • rosuvastatin (CRESTOR) 20 MG tablet Take 1 tablet by mouth Every Night.   • [DISCONTINUED] tamsulosin (FLOMAX) 0.4 MG capsule 24 hr capsule Take 1 capsule by mouth Daily.   • [DISCONTINUED] finasteride (PROSCAR) 5 MG tablet Take 5 mg by mouth Daily.   • [DISCONTINUED] LORazepam (ATIVAN) 1 MG tablet Take 1 mg by mouth 2 (Two) Times a Day As Needed.   • [DISCONTINUED] vitamin D (ERGOCALCIFEROL) 1.25 MG (82873 UT) capsule capsule Every Other Day.     MEDICATION LIST AND ALLERGIES REVIEWED.    Family History   Problem Relation Age of Onset   • Diabetes Mother 77   • Pneumonia Father 90   • Heart attack Sister 63     Social History     Tobacco Use   • Smoking status: Every Day     Packs/day: 1.00     Years: 58.00     Pack years: 58.00     Types: Cigarettes     Start date: 1964   • Smokeless tobacco: Never   Vaping Use   • Vaping Use: Never used   Substance Use Topics   • Alcohol use: Yes     Alcohol/week: 3.0 standard drinks     Types: 2 Cans of beer, 1 Shots of liquor per week     Comment: 3-4 drinks/month   • Drug use: No     Social History     Social History Narrative        Lives with a girlfriend    Works as a     Drinks alcohol occasionally    Smokes approximately one pack cigarettes per day.  Has recently stopped smoking.    Initially used Chantix but unable to continue due to his work    Drinks 1 -2 servings of  "caffeine per day.     FAMILY AND SOCIAL HISTORY REVIEWED.    Review of Systems   Constitutional: Negative for activity change, appetite change, chills and fever.   HENT: Negative for congestion, sore throat and voice change.    Eyes: Negative for photophobia and visual disturbance.   Respiratory: Negative for cough, shortness of breath and wheezing.    Cardiovascular: Negative for chest pain, palpitations and leg swelling.   Gastrointestinal: Negative for abdominal distention and abdominal pain.   Genitourinary: Negative for difficulty urinating and flank pain.   Musculoskeletal: Negative for myalgias and neck stiffness.   Skin: Negative for color change and rash.   Neurological: Negative for dizziness, seizures and headaches.   Hematological: Negative for adenopathy.   Psychiatric/Behavioral: Negative for agitation, hallucinations and sleep disturbance.     ALL OTHER SYSTEMS REVIEWED AND ARE NEGATIVE.    Physical Exam and Clinical Information   /79   Pulse 58   Temp 97.2 °F (36.2 °C) (Temporal)   Resp 17   Ht 180.3 cm (70.98\")   Wt 88.6 kg (195 lb 5.2 oz)   SpO2 98%   BMI 27.25 kg/m²   Physical Exam  Vitals and nursing note reviewed.   Constitutional:       General: He is not in acute distress.     Appearance: He is well-developed and normal weight. He is not ill-appearing or toxic-appearing.   HENT:      Head: Normocephalic and atraumatic.      Right Ear: External ear normal.      Left Ear: External ear normal.      Nose: Nose normal.      Mouth/Throat:      Mouth: Mucous membranes are moist.      Pharynx: Oropharynx is clear. No oropharyngeal exudate or posterior oropharyngeal erythema.   Eyes:      Conjunctiva/sclera: Conjunctivae normal.      Pupils: Pupils are equal, round, and reactive to light.   Cardiovascular:      Rate and Rhythm: Normal rate and regular rhythm.      Pulses: Normal pulses.      Heart sounds: Normal heart sounds. No murmur heard.    No friction rub. No gallop.   Pulmonary:     "  Effort: Pulmonary effort is normal. No respiratory distress.      Breath sounds: Normal breath sounds. No wheezing, rhonchi or rales.   Abdominal:      General: Bowel sounds are normal. There is no distension.      Palpations: Abdomen is soft.      Tenderness: There is no abdominal tenderness. There is no rebound.   Musculoskeletal:         General: Normal range of motion.      Cervical back: Normal range of motion and neck supple. No rigidity.      Right lower leg: No edema.      Left lower leg: No edema.   Skin:     General: Skin is warm and dry.      Capillary Refill: Capillary refill takes less than 2 seconds.   Neurological:      General: No focal deficit present.      Mental Status: He is alert and oriented to person, place, and time.   Psychiatric:         Mood and Affect: Mood normal.         Behavior: Behavior normal.         Thought Content: Thought content normal.         Judgment: Judgment normal.         Results from last 7 days   Lab Units 11/04/22  1349   WBC 10*3/mm3 6.34   HEMOGLOBIN g/dL 13.3   PLATELETS 10*3/mm3 183     Results from last 7 days   Lab Units 11/04/22  1349   SODIUM mmol/L 141   POTASSIUM mmol/L 4.2   CO2 mmol/L 25.0   BUN mg/dL 21   CREATININE mg/dL 0.92   GLUCOSE mg/dL 157*     Estimated Creatinine Clearance: 93.6 mL/min (by C-G formula based on SCr of 0.92 mg/dL).  Results from last 7 days   Lab Units 11/04/22  1349   HEMOGLOBIN A1C % 6.10*         No results found for: LACTATE       I reviewed the patient's results/ images and I agree with the reports.     Impression     AAA (abdominal aortic aneurysm)    Coronary artery disease involving native coronary artery of native heart with angina pectoris (HCC)    Tobacco abuse    GERD (gastroesophageal reflux disease)    Hyperlipidemia LDL goal <70      Plan/Recommendations     73 yo male with PMH known AAA, CAD s/p CABG x 3, Tobacco abuse, HLD, GERD, BPH, Aortic valve sclerosis, NSTEMI, PAF, who presents to ICU s/p EVAR with   Piero.     -Postop orders per surgery  -Nicardipine for blood pressure control per protocol  -Monitor peripheral pulses per protocol  -Aspirin/statin  -Continue metoprolol for hypertension  -Ensure adequate pain control  -Mobilize patient per protocol  -Aggressive pulmonary toilet, goal oxygen saturation greater than 88%  -Lovenox for DVT prophylaxis      AUGUST Carlos DO  Pulmonary and Critical Care Medicine  11/07/22 12:44 EST     CC: Alexandro Owens MD

## 2022-11-07 NOTE — ANESTHESIA PROCEDURE NOTES
Arterial Line      Patient reassessed immediately prior to procedure    Patient location during procedure: pre-op   Line placed for hemodynamic monitoring and ABGs/Labs/ISTAT.  Performed By   CRNA/CAA: Marvin Joshua CRNA SRNA: Curtis Briggs SRNA  Preanesthetic Checklist  Completed: patient identified, IV checked, site marked, risks and benefits discussed, surgical consent, monitors and equipment checked, pre-op evaluation and timeout performed  Arterial Line Prep    Sterile Tech: cap, gloves and mask  Prep: ChloraPrep  Patient monitoring: continuous pulse oximetry and EKG  Arterial Line Procedure   Laterality:right  Location:  radial artery  Catheter size: 20 G   Guidance: landmark technique and palpation technique  Number of attempts: 1  Successful placement: yes   Post Assessment   Dressing Type: biopatch applied, line sutured, wrist guard applied, secured with tape and occlusive dressing applied.   Complications no  Circ/Move/Sens Assessment: normal.   Patient Tolerance: patient tolerated the procedure well with no apparent complications

## 2022-11-07 NOTE — ANESTHESIA POSTPROCEDURE EVALUATION
Patient: Jose Nesbitt    Procedure Summary     Date: 11/07/22 [facility]    Anesthesia Start: 0756 Anesthesia Stop: 0934    Procedure: ENDOVASCULAR REPAIR OF INFRARENAL ABDOMINAL ANEURYSM (Abdomen) Diagnosis:     Surgeons: Gumaro Harry MD Provider: Akash Todd MD    Anesthesia Type: general ASA Status: 3          Anesthesia Type: general    Vitals  No vitals data found for the desired time range.          Post Anesthesia Care and Evaluation    Patient location during evaluation: PACU  Patient participation: complete - patient participated  Level of consciousness: awake and alert  Pain score: 0  Pain management: adequate    Airway patency: patent  Anesthetic complications: No anesthetic complications  PONV Status: none  Cardiovascular status: hemodynamically stable and acceptable  Respiratory status: nonlabored ventilation, acceptable and nasal cannula  Hydration status: acceptable

## 2022-11-07 NOTE — ANESTHESIA PREPROCEDURE EVALUATION
Anesthesia Evaluation     Patient summary reviewed and Nursing notes reviewed   no history of anesthetic complications:  NPO Solid Status: > 8 hours  NPO Liquid Status: > 8 hours           Airway   Mallampati: II  TM distance: >3 FB  Neck ROM: full  No difficulty expected  Dental      Pulmonary - normal exam   Cardiovascular - normal exam    (+) hypertension, past MI , CAD, CABG, angina, hyperlipidemia,       Neuro/Psych  GI/Hepatic/Renal/Endo    (+)  GERD,      Musculoskeletal     Abdominal    Substance History      OB/GYN          Other      history of cancer                    Anesthesia Plan    ASA 3     general     intravenous induction     Anesthetic plan, risks, benefits, and alternatives have been provided, discussed and informed consent has been obtained with: patient.    Plan discussed with CRNA.        CODE STATUS:

## 2022-11-07 NOTE — OP NOTE
ABDOMINAL AORTIC ANEURYSM REPAIR WITH ENDOGRAFT  Procedure Report    Patient Name:  Jose Nesbitt  YOB: 1950    Date of Surgery:  11/7/2022     Indications: 72-year-old male with a rapidly expanding 5 cm infrarenal abdominal aortic aneurysm who is asymptomatic.  Patient was deemed to be a good endovascular candidate for repair.  He was brought to the operating room for percutaneous EVAR.  Risks and benefits were discussed with the patient and he agreed to proceed.    Pre-op Diagnosis:   Rapidly expanding infrarenal 5 cm abdominal aneurysm       Post-Op Diagnosis Codes:  Rapidly expanding infrarenal 5 cm abdominal aneurysm       Procedure/CPT® Codes:  1.  Bilateral percutaneous ultrasound-guided access of common femoral arteries (18 Fr left, 12 Fr right)  2.  Percutaneous endovascular repair of infrarenal abdominal aortic aneurysm       - main body [place] Conformable Endoprosthesis (32 mm, left)       - contralateral limb extension [place] Excluder (16 mm x 13.5 cm,right)       - ipsilateral limb extension [place] Excluder (14.5 mmm x 10 cm)  3.  Aortogram with interpretation      Procedure(s):  ENDOVASCULAR REPAIR OF INFRARENAL ABDOMINAL ANEURYSM    Staff:  Surgeon(s):  Gumaro Harry MD    Circulator: Stephanie Castillo RN  Radiology Technologist: Forrest Louise  Scrub Person: Ramirez Green  Nursing Assistant: Lindy Hernandez PCT       Anesthesia: General    Estimated Blood Loss: 20 mL    Implants:    Implant Name Type Inv. Item Serial No.  Lot No. LRB No. Used Action   STENTGR ENDOPRSTH AAA EXCLUDER I/LAT TRNK 18F 14.5X32MM 14CM - [id number] - [id number] Implant STENTGR ENDOPRSTH AAA EXCLUDER I/LAT TRNK 18F 14.5X32MM 14CM [id number] [employer] . N/A 1 Implanted   GRFT EXCLDR CONTRALAT 12F 16MM 13.5CM - [id number] - [id number] Implant GRFT EXCLDR CONTRALAT 12F 16MM 13.5CM [id number] [employer] . Right 1 Implanted   GRFT EXCLDR CONTRALAT 14.9ISE88LM - [id number] - [id number] Implant  GRFT EXCLDR CONTRALAT [id number] [employer]  . Left 1 Implanted       Specimen:        None      Findings: Successful placement of abdominal aortic endograft with patent renal arteries and internal iliac arteries at the conclusion of the case.  No obvious endoleak.    Complications: None    Description of Procedure: Patient was brought to the operating room, and laid on the table in a supine position.  Patient underwent successful induction of general anesthesia.  Perioperative antibiotics were administered, patient's abdomen and groins were prepped and draped in standard surgical fashion, timeout was performed.  Bilateral common femoral arteries were identified with ultrasound guidance.  Skin and subcutaneous tissues were infiltrated with local anesthetic.  The vessels were accessed utilizing micropuncture technique.  Vessels were predilated with 6 Ethiopian sheaths and 2 Perclose devices were deployed on each side.  8 Ethiopian sheaths were placed and heparin was administered.  Over a glide advantage wire an 18 Ethiopian sheath was placed on the left and a 12 Ethiopian sheath was placed on the right.  The preselected 32 mm Christine conformable Excluder graft was advanced up the left side and a flush catheter was advanced up the right side.  Aortogram was performed.  This allowed us to identify the location of the renal arteries.  The device was positioned below the renal arteries and partially deployed until the contralateral gate opened.  The contralateral gate was cannulated and successful cannulation was confirmed by spinning of the flush catheter within the main body of the endograft.  Additional contrast injection was performed here to ensure that the device was not encroaching on the renal arteries.  The device was then fully deployed to allow the main body apposition against the aortic wall.  The ipsilateral limb remained constrained at this stage.  We then repositioned the C arm into the cranial and the  KAHLIL position and perform contrast injection through the right sided femoral sheath.  This allowed us to identify the location of the right common iliac artery bifurcation.  A 16 mm x 14 cm [place] excluder limb was advanced up the right side and carefully deployed as to not to encroach on the right internal iliac.  After this the rest of the ipsilateral limb was deployed and the delivery system was removed.  The C arm was repositioned into the caudal and SMITH position and contrast injection through the 18 [place] sheath was performed to identify the takeoff of the left internal iliac artery.  A 14.5 mm x 10 cm limb was advanced up the left side and carefully deployed.  After this, a [place]umba molding balloon was advanced and the endograft was angioplastied carefully at its proximal, distal and overlap segments.  Completion aortogram was performed which showed excellent position of the endograft with no obvious signs of an endoleak and patent renal arteries and patent internal iliac arteries as well.  At this stage Heparin effect was reversed with protamine and sheaths were removed.  Perclose devices were tightened.  There was good hemostasis.  There was a palpable femoral pulse distal to the access site bilaterally.  Dermabond was applied to access sites.  Patient was awakened, extubated, transferred to the stretcher and taken to recovery stable condition.  All counts were correct.      Gumaro Harry MD     Date: 11/7/2022  Time: 09:47 EST

## 2022-11-07 NOTE — ANESTHESIA PROCEDURE NOTES
Airway  Urgency: elective    Date/Time: 11/7/2022 8:01 AM  Airway not difficult    General Information and Staff    Patient location during procedure: OR  SRNA: Curtis Briggs SRNA  Indications and Patient Condition  Indications for airway management: airway protection    Preoxygenated: yes  MILS not maintained throughout  Mask difficulty assessment: 2 - vent by mask + OA or adjuvant +/- NMBA    Final Airway Details  Final airway type: endotracheal airway      Successful airway: ETT  Cuffed: yes   Successful intubation technique: direct laryngoscopy  Facilitating devices/methods: intubating stylet  Endotracheal tube insertion site: oral  Blade: Juan  Blade size: 3  ETT size (mm): 7.5  Cormack-Lehane Classification: grade I - full view of glottis  Placement verified by: chest auscultation and capnometry   Cuff volume (mL): 10  Measured from: lips  ETT/EBT  to lips (cm): 21  Number of attempts at approach: 1  Assessment: lips, teeth, and gum same as pre-op and atraumatic intubation    Additional Comments  Negative epigastric sounds, Breath sound equal bilaterally with symmetric chest rise and fall

## 2022-11-07 NOTE — H&P
"  Patient Care Team:      Chief complaint  Abdominal aortic aneurysm    Subjective:    Patient is a 72 y.o.male presents with a history of abdominal aortic aneurysm which has increased in size recently;  Studies show it has reached 5 cm.  He denies \"back pain\" but describes a ''wanda horse\" sensation at times.    Review of Systems:  General ROS: negative  Cardiovascular ROS: no chest pain or dyspnea on exertion  Respiratory ROS: no cough, shortness of breath, or wheezing      Allergies: No Known Allergies       Latex: neg  Contrast Dye neg    Home Meds    Medications Prior to Admission   Medication Sig Dispense Refill Last Dose   • aspirin 81 MG EC tablet Take 1 tablet by mouth Daily.      • diazePAM (VALIUM) 5 MG tablet Take 1 tablet by mouth 2 (Two) Times a Day As Needed for Anxiety.      • esomeprazole (NexIUM) 40 MG capsule Take 40 mg by mouth As Needed.      • finasteride (PROSCAR) 5 MG tablet Take 5 mg by mouth Daily.      • LORazepam (ATIVAN) 1 MG tablet Take 1 mg by mouth 2 (Two) Times a Day As Needed.      • metoprolol succinate XL (TOPROL-XL) 25 MG 24 hr tablet Take 1 tablet by mouth Daily. 90 tablet 3    • rosuvastatin (CRESTOR) 20 MG tablet Take 1 tablet by mouth Every Night. 90 tablet 3    • tamsulosin (FLOMAX) 0.4 MG capsule 24 hr capsule Take 1 capsule by mouth Daily.      • vitamin D (ERGOCALCIFEROL) 1.25 MG (55513 UT) capsule capsule Every Other Day.        PMH:   Past Medical History:   Diagnosis Date   • Acid reflux    • Anesthesia complication     slow to wake up post CABG   • Aneurysm (HCC)    • Borderline diabetes     resolved per pt   • Cancer (HCC)     Skin cancer   • Coronary artery disease    • History of stress test    • Hyperlipidemia    • Hypertension     self-monitors at home, usually WNL   • Myocardial infarct (HCC) 10/12/2016   • Neck discomfort     RELATED TO FALL     PSH:    Past Surgical History:   Procedure Laterality Date   • CARDIAC CATHETERIZATION N/A 10/11/2016    Procedure: " Left Heart Cath;  Surgeon: Travon Rmaey DO;  Location:  [facility];  Service:    • COLONOSCOPY     • CORONARY ARTERY BYPASS GRAFT N/A 10/12/2016    Procedure: ROSALINDA PER ANESTHESIA, MEDIAN STERNOTOMY, CORONARY ARTERY BYPASS GRAFT X  3, UTILIZING THE LEFT INTERNAL MAMMARY ARTERY AND EVH OF RIGHT GREATER SAPHENOUS VEIN;  Surgeon: Joe Hogue MD;  Location:  [facility];  Service:    • CYST REMOVAL      FROM CHEST, APPROX 20 YEARS AGO   • HERNIA REPAIR      APPROX 30 YEARS AGO     Immunization History: pneumo up to date    Flu  2021  Tetanus  ?  COVID x 2  Social History:   Tobacco down to 3/4 ppd   Alcohol occasional      Physical Exam:T 97  HR67 02sat 97%  126/86    General Appearance:    Alert, cooperative, no distress, appears stated age   Head:    Normocephalic, without obvious abnormality, atraumatic   Lungs:     Clear to auscultation bilaterally, respirations unlabored    Heart: Regular rate and rhythm, S1 and S2 normal, no murmur, rub    or gallop    Abdomen:    Soft without tenderness   Breast Exam:    deferred   Genitalia:    deferred   Extremities:  DP 2+ bilaterally Extremities normal, atraumatic, no cyanosis or edema   Skin:   Skin color, texture, turgor normal, no rashes or lesions   Neurologic:   Grossly intact     Results Review:   LABS:  Lab Results   Component Value Date    WBC 6.34 11/04/2022    HGB 13.3 11/04/2022    HCT 43.8 11/04/2022    MCV 78.1 (L) 11/04/2022     11/04/2022    NEUTROABS 7.52 10/13/2016    GLUCOSE 157 (H) 11/04/2022    BUN 21 11/04/2022    CREATININE 0.92 11/04/2022    EGFRIFNONA 114 10/18/2016     11/04/2022    K 4.2 11/04/2022     11/04/2022    CO2 25.0 11/04/2022    MG 2.2 10/14/2016    PHOS 4.3 10/13/2016    CALCIUM 9.1 11/04/2022    ALBUMIN 4.00 10/13/2016    AST 50 (H) 10/12/2016    ALT 35 10/12/2016    BILITOT 0.4 10/12/2016       RADIOLOGY:  Imaging Results (Last 72 Hours)     ** No results found for the last 72 hours. **                Cancer Patient: __ yes __no __unknown; If yes, clinical stage T:__ N:__M:__, stage group    Impression: abdominal aortic aneurysm    Plan: endovascular repair of infrarenal abdominal aneurysm  Elke Capellan PA-C 11/7/2022 07:16 EST

## 2022-11-08 VITALS
DIASTOLIC BLOOD PRESSURE: 67 MMHG | HEIGHT: 71 IN | RESPIRATION RATE: 16 BRPM | TEMPERATURE: 97.6 F | HEART RATE: 73 BPM | BODY MASS INDEX: 27.35 KG/M2 | OXYGEN SATURATION: 95 % | SYSTOLIC BLOOD PRESSURE: 100 MMHG | WEIGHT: 195.33 LBS

## 2022-11-08 LAB
ANION GAP SERPL CALCULATED.3IONS-SCNC: 11 MMOL/L (ref 5–15)
BUN SERPL-MCNC: 23 MG/DL (ref 8–23)
BUN/CREAT SERPL: 26.1 (ref 7–25)
CALCIUM SPEC-SCNC: 8.4 MG/DL (ref 8.6–10.5)
CHLORIDE SERPL-SCNC: 99 MMOL/L (ref 98–107)
CO2 SERPL-SCNC: 22 MMOL/L (ref 22–29)
CREAT SERPL-MCNC: 0.88 MG/DL (ref 0.76–1.27)
DEPRECATED RDW RBC AUTO: 49.5 FL (ref 37–54)
EGFRCR SERPLBLD CKD-EPI 2021: 92.5 ML/MIN/1.73
ERYTHROCYTE [DISTWIDTH] IN BLOOD BY AUTOMATED COUNT: 17.7 % (ref 12.3–15.4)
GLUCOSE SERPL-MCNC: 171 MG/DL (ref 65–99)
HCT VFR BLD AUTO: 37.3 % (ref 37.5–51)
HGB BLD-MCNC: 11.4 G/DL (ref 13–17.7)
MCH RBC QN AUTO: 23.7 PG (ref 26.6–33)
MCHC RBC AUTO-ENTMCNC: 30.6 G/DL (ref 31.5–35.7)
MCV RBC AUTO: 77.4 FL (ref 79–97)
PLATELET # BLD AUTO: 166 10*3/MM3 (ref 140–450)
PMV BLD AUTO: 11.3 FL (ref 6–12)
POTASSIUM SERPL-SCNC: 3.8 MMOL/L (ref 3.5–5.2)
RBC # BLD AUTO: 4.82 10*6/MM3 (ref 4.14–5.8)
SODIUM SERPL-SCNC: 132 MMOL/L (ref 136–145)
WBC NRBC COR # BLD: 12.12 10*3/MM3 (ref 3.4–10.8)

## 2022-11-08 PROCEDURE — 25010000002 CEFAZOLIN IN DEXTROSE 2-4 GM/100ML-% SOLUTION: Performed by: SURGERY

## 2022-11-08 PROCEDURE — 85027 COMPLETE CBC AUTOMATED: CPT | Performed by: SURGERY

## 2022-11-08 PROCEDURE — 80048 BASIC METABOLIC PNL TOTAL CA: CPT | Performed by: SURGERY

## 2022-11-08 PROCEDURE — 25010000002 ENOXAPARIN PER 10 MG: Performed by: SURGERY

## 2022-11-08 RX ORDER — FINASTERIDE 5 MG/1
5 TABLET, FILM COATED ORAL DAILY
Qty: 30 TABLET | Refills: 0 | Status: SHIPPED | OUTPATIENT
Start: 2022-11-09 | End: 2023-03-29

## 2022-11-08 RX ORDER — TAMSULOSIN HYDROCHLORIDE 0.4 MG/1
0.4 CAPSULE ORAL DAILY
Qty: 30 CAPSULE | Refills: 0 | Status: SHIPPED | OUTPATIENT
Start: 2022-11-09 | End: 2023-03-29

## 2022-11-08 RX ORDER — HYDROCODONE BITARTRATE AND ACETAMINOPHEN 5; 325 MG/1; MG/1
1 TABLET ORAL EVERY 6 HOURS PRN
Qty: 15 TABLET | Refills: 0 | Status: SHIPPED | OUTPATIENT
Start: 2022-11-08 | End: 2023-03-29

## 2022-11-08 RX ADMIN — CEFAZOLIN SODIUM 2 G: 2 INJECTION, SOLUTION INTRAVENOUS at 00:37

## 2022-11-08 RX ADMIN — ENOXAPARIN SODIUM 40 MG: 40 INJECTION SUBCUTANEOUS at 08:03

## 2022-11-08 RX ADMIN — ASPIRIN 81 MG: 81 TABLET, COATED ORAL at 08:04

## 2022-11-08 RX ADMIN — PANTOPRAZOLE SODIUM 40 MG: 40 TABLET, DELAYED RELEASE ORAL at 07:55

## 2022-11-08 RX ADMIN — METOPROLOL SUCCINATE 25 MG: 25 TABLET, EXTENDED RELEASE ORAL at 08:05

## 2022-11-08 RX ADMIN — ACETAMINOPHEN 1000 MG: 500 TABLET ORAL at 05:36

## 2022-11-08 NOTE — DISCHARGE SUMMARY
Date of Discharge:  11/8/2022    Discharge Diagnosis: Rapidly expanding infrarenal 5 cm abdominal aneurysm      Presenting Problem/History of Present Illness  Active Hospital Problems    Diagnosis  POA   • **AAA (abdominal aortic aneurysm) [I71.40]  Yes   • Hyperlipidemia LDL goal <70 [E78.5]  Yes   • Coronary artery disease involving native coronary artery of native heart with angina pectoris (HCC) [I25.119]  Yes   • GERD (gastroesophageal reflux disease) [K21.9]  Yes   • Tobacco abuse [Z72.0]  Yes      Resolved Hospital Problems    Diagnosis Date Resolved POA   • Aortic aneurysm without rupture (HCC) [I71.9] 11/07/2022 Yes        Hospital Course  Patient is a 72 y.o. male presented with Rapidly expanding infrarenal 5 cm abdominal aneurysm.  He was admitted under the services of Dr. Harry. After informed consent was given, the patient was taken to the operating room where he underwent:  1.  Bilateral percutaneous ultrasound-guided access of common femoral arteries (18 Fr left, 12 Fr right)  2.  Percutaneous endovascular repair of infrarenal abdominal aortic aneurysm       - main body Sundance Conformable Endoprosthesis (32 mm, left)       - contralateral limb extension Sundance Excluder (16 mm x 13.5 cm,right)       - ipsilateral limb extension Sundance Excluder (14.5 mmm x 10 cm)  3.  Aortogram with interpretation. Post procedure, he was admitted to ICU for monitoring and pain management. After a stable hospital course the patient was ready for transfer back to home. Pain was controlled. Diet and activity were well tolerated.    Procedures Performed    Procedure(s):  ENDOVASCULAR REPAIR OF INFRARENAL ABDOMINAL ANEURYSM  -------------------       Consults:   Consults     No orders found for last 30 day(s).          Pertinent Test Results:   CBC    Results from last 7 days   Lab Units 11/08/22  0309 11/04/22  1349   WBC 10*3/mm3 12.12* 6.34   HEMOGLOBIN g/dL 11.4* 13.3   PLATELETS 10*3/mm3 166 183     BMP   Results  from last 7 days   Lab Units 11/08/22  0309 11/04/22  1349   SODIUM mmol/L 132* 141   POTASSIUM mmol/L 3.8 4.2   CHLORIDE mmol/L 99 105   CO2 mmol/L 22.0 25.0   BUN mg/dL 23 21   CREATININE mg/dL 0.88 0.92   GLUCOSE mg/dL 171* 157*        Condition on Discharge:  Stable    Vital Signs  Temp:  [97 °F (36.1 °C)-97.8 °F (36.6 °C)] 97.6 °F (36.4 °C)  Heart Rate:  [51-93] 62  Resp:  [12-17] 16  BP: ()/() 96/63  Arterial Line BP: ()/(6-87) 103/68    Physical Exam:  AAOx3, NAD, sitting in bedside chair eating breakfast, no family at bedside  Resp: normal effort on room air  Abd: soft, nontender, + BS  Bilateral groins: access sites c/d/i with overlying dermabond  BLE: pink and warm, neuromotor intact    Discharge Disposition  Home or Self Care    Discharge Medications     Discharge Medications      New Medications      Instructions Start Date   HYDROcodone-acetaminophen 5-325 MG per tablet  Commonly known as: Norco   1 tablet, Oral, Every 6 Hours PRN         Continue These Medications      Instructions Start Date   aspirin 81 MG EC tablet   81 mg, Oral, Daily      diazePAM 5 MG tablet  Commonly known as: VALIUM   5 mg, Oral, 2 Times Daily PRN      esomeprazole 40 MG capsule  Commonly known as: nexIUM   40 mg, Oral, As Needed      finasteride 5 MG tablet  Commonly known as: PROSCAR   5 mg, Oral, Daily   Start Date: November 9, 2022     metoprolol succinate XL 25 MG 24 hr tablet  Commonly known as: TOPROL-XL   25 mg, Oral, Daily      rosuvastatin 20 MG tablet  Commonly known as: CRESTOR   20 mg, Oral, Nightly      tamsulosin 0.4 MG capsule 24 hr capsule  Commonly known as: FLOMAX   0.4 mg, Oral, Daily   Start Date: November 9, 2022            Discharge Diet:  Resume normal diet     Activity at Discharge:   - No heavy lifting over 10 pounds  - May resume normal diet  - May shower, but keep operative site clean and dry  - No driving until 24 hours after pain medication    Follow-up Appointments  Future  Appointments   Date Time Provider Department Center   12/19/2023  1:30 PM Colt Sawyer IV, MD [facility]        Candida Herndon PA-C  11/08/22  09:28 EST

## 2022-11-09 LAB
BH BB BLOOD EXPIRATION DATE: NORMAL
BH BB BLOOD EXPIRATION DATE: NORMAL
BH BB BLOOD TYPE BARCODE: 7300
BH BB BLOOD TYPE BARCODE: 7300
BH BB DISPENSE STATUS: NORMAL
BH BB DISPENSE STATUS: NORMAL
BH BB PRODUCT CODE: NORMAL
BH BB PRODUCT CODE: NORMAL
BH BB UNIT NUMBER: NORMAL
BH BB UNIT NUMBER: NORMAL
CROSSMATCH INTERPRETATION: NORMAL
CROSSMATCH INTERPRETATION: NORMAL
UNIT  ABO: NORMAL
UNIT  ABO: NORMAL
UNIT  RH: NORMAL
UNIT  RH: NORMAL

## 2023-03-28 NOTE — PROGRESS NOTES
"    Cardiology Outpatient Visit      Identification: Jose Nesbitt is a 72 y.o. male who resides in [place], [place].     Reason for visit:  Coronary Artery Disease (/)      Subjective      Ezequiel returns to the office today.  He is doing well from a cardiovascular standpoint.  Last November, he underwent EVAR for AAA with Dr. MEJIA.  He has recovered well from it.  He denies any anginal symptoms.  He does have generalized fatigue symptoms.  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    He would like me to order lung cancer screening CT to be done at my [facility] office.        Review of Systems   Constitutional: Positive for malaise/fatigue.   All other systems reviewed and are negative.      No Known Allergies      Current Outpatient Medications   Medication Instructions   • aspirin 81 mg, Oral, Daily   • diazePAM (VALIUM) 5 mg, 2 Times Daily PRN   • esomeprazole (NEXIUM) 40 mg, Oral, As Needed   • metoprolol succinate XL (TOPROL-XL) 25 mg, Oral, Daily   • Naproxen Sodium (ALEVE PO) Oral, As Needed   • rosuvastatin (CRESTOR) 20 mg, Oral, Nightly         Objective     /72 (BP Location: Right arm, Patient Position: Sitting)   Pulse 70   Ht 180.3 cm (71\")   Wt 92.5 kg (204 lb)   SpO2 97%   BMI 28.45 kg/m²       Constitutional:       Appearance: Healthy appearance.   Eyes:      General: No scleral icterus.  Neck:      Thyroid: No thyroid mass.      Vascular: No carotid bruit or JVD. JVD normal.   Pulmonary:      Effort: Pulmonary effort is normal.      Breath sounds: Normal breath sounds.   Cardiovascular:      Normal rate. Regular rhythm.      Murmurs: There is no murmur.      No gallop.   Skin:     General: Skin is warm. There is no cyanosis.   Neurological:      General: No focal deficit present.      Mental Status: Alert.   Psychiatric:         Attention and Perception: Attention normal.         Result Review  (reviewed with patient):            Lab Results   Component Value " Date    GLUCOSE 171 (H) 11/08/2022    BUN 23 11/08/2022    CREATININE 0.88 11/08/2022    EGFR 92.5 11/08/2022    BCR 26.1 (H) 11/08/2022    K 3.8 11/08/2022    CO2 22.0 11/08/2022    CALCIUM 8.4 (L) 11/08/2022    ALBUMIN 4.00 10/13/2016    BILITOT 0.4 10/12/2016    AST 50 (H) 10/12/2016    ALT 35 10/12/2016     Lab Results   Component Value Date    WBC 12.12 (H) 11/08/2022    HGB 11.4 (L) 11/08/2022    HCT 37.3 (L) 11/08/2022    MCV 77.4 (L) 11/08/2022     11/08/2022       Lab Results   Component Value Date    HGBA1C 6.10 (H) 11/04/2022           Assessment     Diagnoses and all orders for this visit:    1. Coronary artery disease involving native coronary artery of native heart with angina pectoris (HCC) (Primary)  Overview:  · Cardiac catheterization for NSTEMI by Travon Ramey (10/11/2016):  Ulcerated critical distal left main disease and multivessel CAD.  · CABG by Dr. Joe Hogue (10/12/2016):  LIMA to LAD, SVG to ramus, SVG to OM1  · Exercise nuclear stress (4/6/17): Exercised for 8.5 minutes. No ischemia/infarct. LVEF 67%.    Assessment & Plan:  · No angina   · Continue aspirin, beta-blocker, statin      2. Hyperlipidemia LDL goal <70  Overview:  · High intensity statin therapy indicated given the presence coronary disease    Assessment & Plan:  · No recent lipids  · Obtain CMP, lipids, direct LDL  · Titrate rosuvastatin if LDL > 70    Orders:  -     Lipid Panel; Future  -     LDL Cholesterol, Direct; Future  -     Comprehensive Metabolic Panel; Future    3. Tobacco abuse  Overview:  · CT chest (7/25/2018): Upper lobe predominant emphysema and central bronchiectasis.  No nodule    Orders:  -     CT chest low dose wo; Future    4. Personal history of nicotine dependence  -     CT chest low dose wo; Future    5. Infrarenal abdominal aortic aneurysm (AAA) without rupture  Overview:  · Abdominal ultrasound (08/2018): 3.2 cm infrarenal abdominal aortic aneurysm.  · Abdominal ultrasound (01/24/2019): 3.2  cm infrarenal abdominal aortic aneurysm  · Abdominal US (07/10/2020): 3.5 cm abdominal aortic aneurysm.  · Abdominal US (6/8/2021):  3.4 cm infrarenal abdominal aortic aneurysm.  · Abdominal US (8/15/2022): Abdominal aortic aneurysm increased from 3.34 cm to 4.7 cm.  · Status post EVAR by Dr. Harry, 11/2022    Assessment & Plan:  · Followed by Dr. MEJIA      6. Microcytic anemia  -     Iron Profile; Future        Plan   • Obtain CMP, CBC, lipids, A1c  · Obtain iron panel due to microcytic anemia on previous lab draw  · Low-dose CT chest for lung cancer screening  · Smoking cessation recommended    Follow-up   Return in about 1 year (around 3/29/2024) for Follow-up with cardiology APRN/PA.        Melecio Sawyer MD, FACC, [facility]  3/29/2023

## 2023-03-29 ENCOUNTER — OFFICE VISIT (OUTPATIENT)
Dept: CARDIOLOGY | Facility: CLINIC | Age: 73
End: 2023-03-29
Payer: MEDICARE

## 2023-03-29 VITALS
HEART RATE: 70 BPM | SYSTOLIC BLOOD PRESSURE: 110 MMHG | BODY MASS INDEX: 28.56 KG/M2 | DIASTOLIC BLOOD PRESSURE: 72 MMHG | HEIGHT: 71 IN | OXYGEN SATURATION: 97 % | WEIGHT: 204 LBS

## 2023-03-29 DIAGNOSIS — I25.119 CORONARY ARTERY DISEASE INVOLVING NATIVE CORONARY ARTERY OF NATIVE HEART WITH ANGINA PECTORIS: Primary | Chronic | ICD-10-CM

## 2023-03-29 DIAGNOSIS — Z72.0 TOBACCO ABUSE: Chronic | ICD-10-CM

## 2023-03-29 DIAGNOSIS — E78.5 HYPERLIPIDEMIA LDL GOAL <70: Chronic | ICD-10-CM

## 2023-03-29 DIAGNOSIS — I71.43 INFRARENAL ABDOMINAL AORTIC ANEURYSM (AAA) WITHOUT RUPTURE: ICD-10-CM

## 2023-03-29 DIAGNOSIS — D50.9 MICROCYTIC ANEMIA: ICD-10-CM

## 2023-03-29 DIAGNOSIS — Z87.891 PERSONAL HISTORY OF NICOTINE DEPENDENCE: ICD-10-CM

## 2023-03-29 PROBLEM — I71.40 AAA (ABDOMINAL AORTIC ANEURYSM): Status: RESOLVED | Noted: 2022-11-07 | Resolved: 2023-03-29

## 2023-03-29 PROCEDURE — 1160F RVW MEDS BY RX/DR IN RCRD: CPT | Performed by: INTERNAL MEDICINE

## 2023-03-29 PROCEDURE — 1159F MED LIST DOCD IN RCRD: CPT | Performed by: INTERNAL MEDICINE

## 2023-03-29 PROCEDURE — 99214 OFFICE O/P EST MOD 30 MIN: CPT | Performed by: INTERNAL MEDICINE

## 2023-03-29 RX ORDER — ROSUVASTATIN CALCIUM 20 MG/1
20 TABLET, COATED ORAL NIGHTLY
Qty: 90 TABLET | Refills: 3 | Status: SHIPPED | OUTPATIENT
Start: 2023-03-29

## 2023-04-17 ENCOUNTER — HOSPITAL ENCOUNTER (OUTPATIENT)
Dept: CT IMAGING | Facility: HOSPITAL | Age: 73
Discharge: HOME | End: 2023-04-17
Admitting: INTERNAL MEDICINE
Payer: MEDICARE

## 2023-04-17 DIAGNOSIS — Z87.891 PERSONAL HISTORY OF NICOTINE DEPENDENCE: ICD-10-CM

## 2023-04-17 DIAGNOSIS — Z72.0 TOBACCO ABUSE: Chronic | ICD-10-CM

## 2023-04-17 PROCEDURE — 71271 CT THORAX LUNG CANCER SCR C-: CPT

## 2023-04-24 ENCOUNTER — TELEPHONE (OUTPATIENT)
Dept: CARDIOLOGY | Facility: CLINIC | Age: 73
End: 2023-04-24
Payer: MEDICARE

## 2023-12-18 NOTE — PROGRESS NOTES
"    Cardiology Outpatient Visit      Identification: Jose Nesbitt is a 73 y.o. male who resides in [place], [place].     Reason for visit:  CAD  CV risk factors  Ongoing tobacco abuse      Constance Acevedo returns to the office today.  He is doing well and denies any anginal or heart failure symptoms.  He continues smoke cigarettes and does not plan to quit.    He had blood work performed in August that showed LDL >100.  He admits that he does not take rosuvastatin every day.  He has difficulty remembering to take this medicine.    ROS    No Known Allergies      Current Outpatient Medications   Medication Instructions    aspirin 81 mg, Oral, Daily    esomeprazole (NEXIUM) 40 mg, Oral, As Needed    ezetimibe (ZETIA) 10 mg, Oral, Daily    metoprolol succinate XL (TOPROL-XL) 25 mg, Oral, Daily    Naproxen Sodium (ALEVE PO) Oral, As Needed    rosuvastatin (CRESTOR) 20 mg, Oral, Nightly         Objective     /64 (BP Location: Right arm, Patient Position: Sitting, Cuff Size: Adult)   Pulse 65   Ht 180.3 cm (70.98\")   Wt 89.8 kg (198 lb)   SpO2 95%   BMI 27.63 kg/m²       Constitutional:       Appearance: Healthy appearance.   Eyes:      General: No scleral icterus.  Neck:      Thyroid: No thyroid mass.      Vascular: No carotid bruit or JVD. JVD normal.   Pulmonary:      Effort: Pulmonary effort is normal.      Breath sounds: Normal breath sounds.   Cardiovascular:      Normal rate. Regular rhythm.      Murmurs: There is no murmur.      No gallop.    Edema:     Peripheral edema absent.   Skin:     General: Skin is warm. There is no cyanosis.   Neurological:      General: No focal deficit present.      Mental Status: Alert.   Psychiatric:         Attention and Perception: Attention normal.         Result Review  (reviewed with patient):            Labs (8/22/2023):  WBC 7.2, RBC 5.84, HGB 14.3, HCT 45.1,   Glucose 130, creat 0.95, BUN 18, Na 137, K 5.0, AST 15, ALT 14  Chol 171, Trig 153, HDL 41, LDL " 103          Assessment     Diagnoses and all orders for this visit:    1. Coronary artery disease involving native coronary artery of native heart with angina pectoris (Primary)  Overview:  Cardiac catheterization for NSTEMI by Travon Ramey (10/11/2016):  Ulcerated critical distal left main disease and multivessel CAD.  CABG by Dr. Joe Hogue (10/12/2016):  LIMA to LAD, SVG to ramus, SVG to OM1  Exercise nuclear stress (4/6/17): Exercised for 8.5 minutes. No ischemia/infarct. LVEF 67%.    Assessment & Plan:  No angina   Continue aspirin, beta-blocker, statin    Orders:  -     aspirin 81 MG EC tablet; Take 1 tablet by mouth Daily.  -     metoprolol succinate XL (TOPROL-XL) 25 MG 24 hr tablet; Take 1 tablet by mouth Daily.  Dispense: 90 tablet; Refill: 3    2. Hyperlipidemia LDL goal <70  Overview:  High intensity statin therapy indicated given the presence coronary disease    Assessment & Plan:  LDL uncontrolled  Patient encouraged to take rosuvastatin daily if possible  Prescribed ezetimibe 10 mg daily  Repeat direct LDL in 6 weeks.    If LDL >70, refer to [facility] pharmacy for inclisiran injection    Orders:  -     rosuvastatin (CRESTOR) 20 MG tablet; Take 1 tablet by mouth Every Night.  Dispense: 90 tablet; Refill: 3  -     ezetimibe (ZETIA) 10 MG tablet; Take 1 tablet by mouth Daily.  Dispense: 90 tablet; Refill: 3  -     LDL Cholesterol, Direct; Future    3. Infrarenal abdominal aortic aneurysm (AAA) without rupture  Overview:  Abdominal ultrasound (08/2018): 3.2 cm infrarenal abdominal aortic aneurysm.  Abdominal ultrasound (01/24/2019): 3.2 cm infrarenal abdominal aortic aneurysm  Abdominal US (07/10/2020): 3.5 cm abdominal aortic aneurysm.  Abdominal US (6/8/2021):  3.4 cm infrarenal abdominal aortic aneurysm.  Abdominal US (8/15/2022): Abdominal aortic aneurysm increased from 3.34 cm to 4.7 cm.  Status post EVAR by Dr. Harry, 11/2022      4. Tobacco abuse  Overview:  CT chest  (7/25/2018): Upper lobe predominant emphysema and central bronchiectasis.  No nodule    Assessment & Plan:  Smoking cessation recommended, declined            Plan   Encouraged compliance with rosuvastatin  Ezetimibe 10 mg daily  Direct LDL in 6 weeks  If LDL >70, recommend inclisiran injections via [facility] pharmacy      Follow-up   Return in about 1 year (around 12/19/2024).        Melecio Sawyer MD, FACC, [facility]  12/19/2023

## 2023-12-19 ENCOUNTER — OFFICE VISIT (OUTPATIENT)
Dept: CARDIOLOGY | Facility: CLINIC | Age: 73
End: 2023-12-19
Payer: MEDICARE

## 2023-12-19 VITALS
HEART RATE: 65 BPM | BODY MASS INDEX: 27.72 KG/M2 | WEIGHT: 198 LBS | OXYGEN SATURATION: 95 % | HEIGHT: 71 IN | DIASTOLIC BLOOD PRESSURE: 64 MMHG | SYSTOLIC BLOOD PRESSURE: 118 MMHG

## 2023-12-19 DIAGNOSIS — Z72.0 TOBACCO ABUSE: Chronic | ICD-10-CM

## 2023-12-19 DIAGNOSIS — I71.43 INFRARENAL ABDOMINAL AORTIC ANEURYSM (AAA) WITHOUT RUPTURE: ICD-10-CM

## 2023-12-19 DIAGNOSIS — E78.5 HYPERLIPIDEMIA LDL GOAL <70: Chronic | ICD-10-CM

## 2023-12-19 DIAGNOSIS — I25.119 CORONARY ARTERY DISEASE INVOLVING NATIVE CORONARY ARTERY OF NATIVE HEART WITH ANGINA PECTORIS: Primary | ICD-10-CM

## 2023-12-19 PROCEDURE — 1159F MED LIST DOCD IN RCRD: CPT | Performed by: INTERNAL MEDICINE

## 2023-12-19 PROCEDURE — 99214 OFFICE O/P EST MOD 30 MIN: CPT | Performed by: INTERNAL MEDICINE

## 2023-12-19 PROCEDURE — 1160F RVW MEDS BY RX/DR IN RCRD: CPT | Performed by: INTERNAL MEDICINE

## 2023-12-19 RX ORDER — ROSUVASTATIN CALCIUM 20 MG/1
20 TABLET, COATED ORAL NIGHTLY
Qty: 90 TABLET | Refills: 3 | Status: SHIPPED | OUTPATIENT
Start: 2023-12-19

## 2023-12-19 RX ORDER — EZETIMIBE 10 MG/1
10 TABLET ORAL DAILY
Qty: 90 TABLET | Refills: 3 | Status: SHIPPED | OUTPATIENT
Start: 2023-12-19

## 2023-12-19 RX ORDER — ASPIRIN 81 MG/1
81 TABLET ORAL DAILY
Start: 2023-12-19

## 2023-12-19 RX ORDER — METOPROLOL SUCCINATE 25 MG/1
25 TABLET, EXTENDED RELEASE ORAL DAILY
Qty: 90 TABLET | Refills: 3 | Status: SHIPPED | OUTPATIENT
Start: 2023-12-19

## 2023-12-19 NOTE — ASSESSMENT & PLAN NOTE
LDL uncontrolled  Patient encouraged to take rosuvastatin daily if possible  Prescribed ezetimibe 10 mg daily  Repeat direct LDL in 6 weeks.    If LDL >70, refer to [facility] pharmacy for inclisiran injection

## 2024-04-11 NOTE — PROGRESS NOTES
"    Cardiology Outpatient Visit      Identification: Jose Nesbitt is a 73 y.o. male who resides in [place], [place].     Reason for visit:  CAD      Subjective      Curtis returns to the office for 1 year follow-up.  He is doing well from a cardiovascular standpoint.  He continues to work hauling heavy equipment.  He denies anginal symptoms.  He has been taking his rosuvastatin and ezetimibe.  His last LDL was 84.    Review of Systems   Cardiovascular: Negative.    Respiratory: Negative.         No Known Allergies      Current Outpatient Medications   Medication Instructions    aspirin 81 mg, Oral, Daily    diazePAM (VALIUM) 5 mg, Oral, Every 12 Hours PRN    esomeprazole (NEXIUM) 40 mg, Oral, As Needed    ezetimibe (ZETIA) 10 mg, Oral, Daily    metoprolol succinate XL (TOPROL-XL) 25 mg, Oral, Daily    Naproxen Sodium (ALEVE PO) Oral, As Needed    rosuvastatin (CRESTOR) 20 mg, Oral, Nightly         Objective     /80 (BP Location: Right arm, Patient Position: Sitting, Cuff Size: Adult)   Pulse 61   Ht 180.3 cm (71\")   Wt 90.7 kg (200 lb)   SpO2 100%   BMI 27.89 kg/m²       Constitutional:       Appearance: Healthy appearance.   Eyes:      General: No scleral icterus.  Neck:      Thyroid: No thyroid mass.      Vascular: No carotid bruit or JVD. JVD normal.   Pulmonary:      Effort: Pulmonary effort is normal.      Breath sounds: Normal breath sounds.   Cardiovascular:      Normal rate. Regular rhythm.      Murmurs: There is no murmur.      No gallop.    Edema:     Peripheral edema absent.   Skin:     General: Skin is warm. There is no cyanosis.   Neurological:      General: No focal deficit present.      Mental Status: Alert.   Psychiatric:         Attention and Perception: Attention normal.         Result Review  (reviewed with patient):            Labs (1/24/2024):  Chol 158, Trig 307, HDL 36, LDL 85  Glucose 144, creat 0.98, BUN 22, Na 139, K 4.2, AST 16, ALT 15  TSH 1.53  HA1C 6.1  WBC 5.6, RBC 5.65, HGB 14.7, " HCT 45.7,           Assessment     Diagnoses and all orders for this visit:    1. Coronary artery disease involving native coronary artery of native heart without angina pectoris (Primary)  Overview:  Cardiac catheterization for NSTEMI by Travon Ramey (10/11/2016):  Ulcerated critical distal left main disease and multivessel CAD.  CABG by Dr. Joe Hogue (10/12/2016):  LIMA to LAD, SVG to ramus, SVG to OM1  Exercise nuclear stress (4/6/17): Exercised for 8.5 minutes. No ischemia/infarct. LVEF 67%.    Assessment & Plan:  No angina  Continue aspirin, statin     Orders:  -     aspirin 81 MG EC tablet; Take 1 tablet by mouth Daily.  -     metoprolol succinate XL (TOPROL-XL) 25 MG 24 hr tablet; Take 1 tablet by mouth Daily.  Dispense: 90 tablet; Refill: 3    2. Hyperlipidemia LDL goal <70  Overview:  High intensity statin therapy indicated given the presence coronary disease    Assessment & Plan:  LDL not to goal despite rosuvastatin and ezetimibe  Refer to [facility] pharmacy for initiation of inclisiran    Orders:  -     rosuvastatin (CRESTOR) 20 MG tablet; Take 1 tablet by mouth Every Night.  Dispense: 90 tablet; Refill: 3  -     ezetimibe (ZETIA) 10 MG tablet; Take 1 tablet by mouth Daily.  Dispense: 90 tablet; Refill: 3  -     Ambulatory Referral to Disease State Management    3. Infrarenal abdominal aortic aneurysm (AAA) without rupture  Overview:  Abdominal ultrasound (08/2018): 3.2 cm infrarenal abdominal aortic aneurysm.  Abdominal ultrasound (01/24/2019): 3.2 cm infrarenal abdominal aortic aneurysm  Abdominal US (07/10/2020): 3.5 cm abdominal aortic aneurysm.  Abdominal US (6/8/2021):  3.4 cm infrarenal abdominal aortic aneurysm.  Abdominal US (8/15/2022): Abdominal aortic aneurysm increased from 3.34 cm to 4.7 cm.  Status post EVAR by Dr. Harry, 11/2022    Assessment & Plan:  Follow-up with Dr. ROBERTO Dixon. Tobacco abuse  Overview:  CT chest (7/25/2018): Upper lobe predominant  emphysema and central bronchiectasis.  No nodule            Plan   Refer to [facility] pharmacy for initiation of inclisiran  Smoking cessation recommended      Follow-up   Return in about 1 year (around 4/12/2025).        Melecio Sawyer MD, FACC, [facility]  4/12/2024

## 2024-04-12 ENCOUNTER — OFFICE VISIT (OUTPATIENT)
Dept: CARDIOLOGY | Facility: CLINIC | Age: 74
End: 2024-04-12
Payer: MEDICARE

## 2024-04-12 VITALS
BODY MASS INDEX: 28 KG/M2 | HEART RATE: 61 BPM | HEIGHT: 71 IN | WEIGHT: 200 LBS | OXYGEN SATURATION: 100 % | DIASTOLIC BLOOD PRESSURE: 80 MMHG | SYSTOLIC BLOOD PRESSURE: 132 MMHG

## 2024-04-12 DIAGNOSIS — I25.10 CORONARY ARTERY DISEASE INVOLVING NATIVE CORONARY ARTERY OF NATIVE HEART WITHOUT ANGINA PECTORIS: Primary | ICD-10-CM

## 2024-04-12 DIAGNOSIS — I71.43 INFRARENAL ABDOMINAL AORTIC ANEURYSM (AAA) WITHOUT RUPTURE: ICD-10-CM

## 2024-04-12 DIAGNOSIS — Z72.0 TOBACCO ABUSE: Chronic | ICD-10-CM

## 2024-04-12 DIAGNOSIS — E78.5 HYPERLIPIDEMIA LDL GOAL <70: Chronic | ICD-10-CM

## 2024-04-12 RX ORDER — METOPROLOL SUCCINATE 25 MG/1
25 TABLET, EXTENDED RELEASE ORAL DAILY
Qty: 90 TABLET | Refills: 3 | Status: SHIPPED | OUTPATIENT
Start: 2024-04-12

## 2024-04-12 RX ORDER — EZETIMIBE 10 MG/1
10 TABLET ORAL DAILY
Qty: 90 TABLET | Refills: 3 | Status: SHIPPED | OUTPATIENT
Start: 2024-04-12

## 2024-04-12 RX ORDER — ASPIRIN 81 MG/1
81 TABLET ORAL DAILY
Start: 2024-04-12

## 2024-04-12 RX ORDER — DIAZEPAM 5 MG/1
5 TABLET ORAL EVERY 12 HOURS PRN
COMMUNITY
Start: 2024-01-02

## 2024-04-12 RX ORDER — ROSUVASTATIN CALCIUM 20 MG/1
20 TABLET, COATED ORAL NIGHTLY
Qty: 90 TABLET | Refills: 3 | Status: SHIPPED | OUTPATIENT
Start: 2024-04-12

## 2024-04-12 NOTE — ASSESSMENT & PLAN NOTE
LDL not to goal despite rosuvastatin and ezetimibe  Refer to [facility] pharmacy for initiation of inclisiran

## 2024-04-19 ENCOUNTER — SPECIALTY PHARMACY (OUTPATIENT)
Dept: PHARMACY | Facility: HOSPITAL | Age: 74
End: 2024-04-19
Payer: MEDICARE

## 2024-04-19 ENCOUNTER — DISEASE STATE MANAGEMENT VISIT (OUTPATIENT)
Dept: PHARMACY | Facility: HOSPITAL | Age: 74
End: 2024-04-19
Payer: MEDICARE

## 2024-04-19 NOTE — PROGRESS NOTES
[facility]  Lipid Management Program - PCSK9i       Jose Nesbitt is a 73 y.o. male referred to the [facility] by Colt Sawyer for clinical pharmacy and specialty pharmacy management of PCSK9i.  Jose Nesbitt is  treated for clinical ASCVD/hyperlipidemia and currently takes rosuvastatin (compliance issues).  In the past, Pt has tried zetia and crestor. The patient voices complaince issues and not taking his tablets the as prescribed. He also experienced myalgias with statins. The patient denies any allergies to latex.      Relevant Past Medical History and Comorbidities  Past Medical History:   Diagnosis Date    Acid reflux     Anesthesia complication     slow to wake up post CABG    Aneurysm     Borderline diabetes     resolved per pt    Cancer     Skin cancer    Coronary artery disease     History of stress test     Hyperlipidemia     Hypertension     self-monitors at home, usually WNL    Myocardial infarct 10/12/2016    Neck discomfort     RELATED TO FALL     Social History     Socioeconomic History    Marital status:     Number of children: 2   Tobacco Use    Smoking status: Every Day     Current packs/day: 1.00     Average packs/day: 1 pack/day for 60.3 years (60.3 ttl pk-yrs)     Types: Cigarettes     Start date: 1964     Passive exposure: Past    Smokeless tobacco: Never    Tobacco comments:     Down to a pack a day   Vaping Use    Vaping status: Never Used   Substance and Sexual Activity    Alcohol use: Yes     Alcohol/week: 3.0 standard drinks of alcohol     Types: 2 Cans of beer, 1 Shots of liquor per week     Comment: 3-4 drinks/month    Drug use: No    Sexual activity: Defer       Allergies  Patient has no known allergies.    Current Medication List    Current Outpatient Medications:     aspirin 81 MG EC tablet, Take 1 tablet by mouth Daily., Disp: , Rfl:     diazePAM (VALIUM) 5 MG tablet, Take 1 tablet by mouth Every 12 (Twelve) Hours As Needed., Disp: ,  Rfl:     esomeprazole (NexIUM) 40 MG capsule, Take 1 capsule by mouth As Needed., Disp: , Rfl:     metoprolol succinate XL (TOPROL-XL) 25 MG 24 hr tablet, Take 1 tablet by mouth Daily., Disp: 90 tablet, Rfl: 3    rosuvastatin (CRESTOR) 20 MG tablet, Take 1 tablet by mouth Every Night. (Patient taking differently: Take 1 tablet by mouth Every Night. Compliance issues), Disp: 90 tablet, Rfl: 3    Evolocumab with Infusor (REPATHA) solution cartridge, Inject 3.5 mL under the skin into the appropriate area as directed Every 30 (Thirty) Days., Disp: 3.5 mL, Rfl: 5    ezetimibe (ZETIA) 10 MG tablet, Take 1 tablet by mouth Daily. (Patient not taking: Reported on 4/19/2024), Disp: 90 tablet, Rfl: 3    Naproxen Sodium (ALEVE PO), Take  by mouth As Needed. (Patient not taking: Reported on 4/19/2024), Disp: , Rfl:     Drug Interactions  none    Relevant Laboratory Values      Medication Assessment & Plan    Patient started today on Repatha Pushtronex. Injection training and medication education provided.     Pt administered/I administered...    Patient will need lipid panel in 6 weeks, order placed.     Patient will continue regular follow-up with cardiology.     Will follow up with specialty pharmacy/will follow up in clinic for next injection    Will follow-up in 6 months, or sooner if needed.     Karen Vail, PharmD  4/19/2024  10:58 EDT

## 2024-04-19 NOTE — PROGRESS NOTES
Initial Education Provided for Repatha    Patient seen in the [facility]ication Management Clinic for initial education and injection training for PCSK9 inhibitors. The patient was introduced to services offered by [facility], including: regular assessments, refill coordination, curbside pick-up or mail order delivery options, prior authorization maintenance, and financial assistance programs as applicable. The patient was also provided with contact information for the pharmacy team. Welcome information and patient satisfaction survey to be sent by retail team with patient's initial fill.    Patient Instructions    Repatha is used to lower LDL, or bad cholesterol, to help reduce your chance of a heart attack or stroke.  You should give your injection once every 28 days.  Your doctor will likely keep you on this medication indefinitely as long as it is working for you and not causing any adverse effects.      This medication should be kept in the refrigerator until you are ready to use it.  Once removed from the refrigerator, it is good at room temperature for 30 days.  Do not leave in your car or expose to extreme heat.  Do not shake or freeze.  Dispose the used syringe/device in a sharps container.     This injection is a subcutaneous injection, which means just under the skin.  It is important to choose an area of your skin that is not tender, bruised, cut or has scars or stretch marks.  You can inject into your abdomen (except for 2 inches around your navel), your thigh or your upper arm.  Do not administer with other drugs.   Rotate injection sites each time you give the injection. Wash your hands prior to giving yourself an injection and use an alcohol wipe to clean the area of the injection and allow to dry prior to injecting.      If you miss a dose, take it as soon as you remember and resume the original schedule if it is within 7 days from the missed dose.  If an every 2 week dose is not  administered within 7 days, wait until the next dose on the original schedule.  If a once-monthly dose is not administered within 7 days, administer the dose and start a new schedule based on this date.     Be sure to let your doctor or pharmacist know of any medication changes, including OTC and herbal supplements.     Adverse Effects  Reviewed with patient and education on management provided.     Sore Throat  Injection site pain  Itching or irritation   Flu-like symptoms  Signs of an allergic reaction     Immunizations  While there are no immunizations that you need to get specifically because you are on this drug, it is important to keep up with your recommended routine vaccinations.     Adherence and Self-Administration    Barriers to Patient Adherence and/or Self-Administration: none  Methods for Supporting Patient Adherence and/or Self-Administration: none    Goals of Therapy  Patient Goals of Therapy: LDL < 70   Clinical Goals or Therapeutic Targets, If Applicable: LDL Reduction      Attestation  I attest that the initiated specialty medication(s) are appropriate for the patient based on my assessment.  If the prescribed therapy is at any point deemed not appropriate based on the current or future assessments, a consultation will be initiated with the patient's specialty care provider to determine the best course of action. The revised plan of therapy will be documented along with any additional patient education provided.       The patient has been provided with the following education and any applicable administration techniques (i.e. self-injection) have been demonstrated for the therapies indicated. All questions and concerns have been addressed prior to the patient receiving the medication, and the patient has verbalized understanding of the education and any materials provided.  Additional patient education shall be provided and documented upon request by the patient, provider or payer.      The  patient would like to receive specialty mail-out services for the next injection. Care Coordinator to set up future refill outreaches, coordinate prescription delivery, and escalate clinical questions to pharmacist.     Patient should receive a lipid panel in 4-12 weeks.  Order has been placed.     Thank you,     Karen Vail, PharmD  04/19/24  11:04 EDT

## 2024-04-19 NOTE — PROGRESS NOTES
Medication Management Clinic  Lipid Management Program - PCSK9i       Jose Nesbitt is a 73 y.o. male referred to the [facility] Management Clinic by Colt Sawyer for clinical pharmacy and specialty pharmacy management of PCSK9i.  Jose Nesbitt is  treated for clinical ASCVD/hyperlipidemia and currently takes rosuvastatin (compliance issues).  In the past, Pt has tried zetia and crestor. The patient voices complaince issues and not taking his tablets the as prescribed. He also experienced myalgias with statins. The patient denies any allergies to latex.      Relevant Past Medical History and Comorbidities  Past Medical History:   Diagnosis Date    Acid reflux     Anesthesia complication     slow to wake up post CABG    Aneurysm     Borderline diabetes     resolved per pt    Cancer     Skin cancer    Coronary artery disease     History of stress test     Hyperlipidemia     Hypertension     self-monitors at home, usually WNL    Myocardial infarct 10/12/2016    Neck discomfort     RELATED TO FALL     Social History     Socioeconomic History    Marital status:     Number of children: 2   Tobacco Use    Smoking status: Every Day     Current packs/day: 1.00     Average packs/day: 1 pack/day for 60.3 years (60.3 ttl pk-yrs)     Types: Cigarettes     Start date: 1964     Passive exposure: Past    Smokeless tobacco: Never    Tobacco comments:     Down to a pack a day   Vaping Use    Vaping status: Never Used   Substance and Sexual Activity    Alcohol use: Yes     Alcohol/week: 3.0 standard drinks of alcohol     Types: 2 Cans of beer, 1 Shots of liquor per week     Comment: 3-4 drinks/month    Drug use: No    Sexual activity: Defer       Allergies  Patient has no known allergies.    Current Medication List    Current Outpatient Medications:     aspirin 81 MG EC tablet, Take 1 tablet by mouth Daily., Disp: , Rfl:     diazePAM (VALIUM) 5 MG tablet, Take 1 tablet by mouth Every 12 (Twelve) Hours As Needed., Disp: ,  Rfl:     esomeprazole (NexIUM) 40 MG capsule, Take 1 capsule by mouth As Needed., Disp: , Rfl:     Evolocumab with Infusor (REPATHA) solution cartridge, Inject 3.5 mL under the skin into the appropriate area as directed Every 30 (Thirty) Days., Disp: 3.5 mL, Rfl: 5    ezetimibe (ZETIA) 10 MG tablet, Take 1 tablet by mouth Daily. (Patient not taking: Reported on 4/19/2024), Disp: 90 tablet, Rfl: 3    metoprolol succinate XL (TOPROL-XL) 25 MG 24 hr tablet, Take 1 tablet by mouth Daily., Disp: 90 tablet, Rfl: 3    Naproxen Sodium (ALEVE PO), Take  by mouth As Needed. (Patient not taking: Reported on 4/19/2024), Disp: , Rfl:     rosuvastatin (CRESTOR) 20 MG tablet, Take 1 tablet by mouth Every Night. (Patient taking differently: Take 1 tablet by mouth Every Night. Compliance issues), Disp: 90 tablet, Rfl: 3    Drug Interactions  none    Relevant Laboratory Values      Medication Assessment & Plan    Patient started today on Repatha Pushtronex. Injection training and medication education provided.     Pt administered/I administered...    Patient will need lipid panel in 6 weeks, order placed.     Patient will continue regular follow-up with cardiology.     Will follow up with specialty pharmacy/will follow up in clinic for next injection    Will follow-up in 6 months, or sooner if needed.     Karen Vail, PharmD  4/19/2024  11:03 EDT

## 2024-05-14 ENCOUNTER — SPECIALTY PHARMACY (OUTPATIENT)
Dept: PHARMACY | Facility: HOSPITAL | Age: 74
End: 2024-05-14
Payer: MEDICARE

## 2024-05-14 NOTE — PROGRESS NOTES
"Specialty Pharmacy Refill Coordination Note     Jose \"Ezequiel\" is a 73 y.o. male contacted today regarding refills of  Repatha Pushtronex specialty medication(s).    Reviewed and verified with patient:       Specialty medication(s) and dose(s) confirmed: yes    Refill Questions      Flowsheet Row Most Recent Value   Changes to allergies? No   Changes to medications? No   New conditions or infections since last clinic visit No   Unplanned office visit, urgent care, ED, or hospital admission in the last 4 weeks  No   How does patient/caregiver feel medication is working? Very good   Financial problems or insurance changes  No   Since the previous refill, were any specialty medication doses or scheduled injections missed or delayed?  No   Does this patient require a clinical escalation to a pharmacist? No            Delivery Questions      Flowsheet Row Most Recent Value   Copay verified? Yes   Copay amount 0   Copay form of payment No copayment ($0)                   Follow-up: 28 day(s)     Ginna Ruggiero, Pharmacy Technician  Specialty Pharmacy Technician        "

## 2024-06-11 ENCOUNTER — SPECIALTY PHARMACY (OUTPATIENT)
Dept: PHARMACY | Facility: HOSPITAL | Age: 74
End: 2024-06-11
Payer: MEDICARE

## 2024-06-11 NOTE — PROGRESS NOTES
"Specialty Pharmacy Refill Coordination Note     Jose \"Ezequiel\" is a 73 y.o. male contacted today regarding refills of  Leqvio specialty medication(s).    Reviewed and verified with patient:       Specialty medication(s) and dose(s) confirmed: yes    Refill Questions      Flowsheet Row Most Recent Value   Changes to allergies? No   Changes to medications? No   New conditions or infections since last clinic visit No   Unplanned office visit, urgent care, ED, or hospital admission in the last 4 weeks  No   How does patient/caregiver feel medication is working? Very good   Financial problems or insurance changes  No   Since the previous refill, were any specialty medication doses or scheduled injections missed or delayed?  No   Does this patient require a clinical escalation to a pharmacist? No            Delivery Questions      Flowsheet Row Most Recent Value   Copay verified? Yes   Copay amount 0   Copay form of payment No copayment ($0)                   Follow-up: 90 day(s)     Ginna Ruggiero, Pharmacy Technician  Specialty Pharmacy Technician        "

## 2024-06-17 ENCOUNTER — DISEASE STATE MANAGEMENT VISIT (OUTPATIENT)
Dept: PHARMACY | Facility: HOSPITAL | Age: 74
End: 2024-06-17
Payer: MEDICARE

## 2024-06-17 ENCOUNTER — SPECIALTY PHARMACY (OUTPATIENT)
Dept: PHARMACY | Facility: HOSPITAL | Age: 74
End: 2024-06-17
Payer: MEDICARE

## 2024-06-17 DIAGNOSIS — E78.5 HYPERLIPIDEMIA LDL GOAL <70: Primary | Chronic | ICD-10-CM

## 2024-06-17 RX ORDER — INCLISIRAN 284 MG/1.5ML
1.5 INJECTION, SOLUTION SUBCUTANEOUS
Qty: 1.5 ML | Refills: 1 | Status: SHIPPED | OUTPATIENT
Start: 2024-06-17

## 2024-06-17 NOTE — PROGRESS NOTES
Medication Management Clinic  Lipid Management Program - [facility]       Jose Nesbitt is a 73 y.o. male referred to the [facility] Clinic by Colt Sawyer for clinical pharmacy and specialty pharmacy management of PCSK9i.  Jose Nesbitt is  treated for clinical ASCVD/hyperlipidemia and currently takes rosuvastatin (compliance issues).  In the past, Pt has tried zetia and crestor. The patient voices complaince issues and not taking his tablets the as prescribed and has not been taking either zetia or crestor. He also experienced myalgias with statins. The patient denies any allergies to latex.      Of late, patient was most recently started on Repatha Pushtronex for 2 doses. Patient reports that he thinks that he got a little nausea after the first injection and then that he tolerated the 2nd injection well. Patient reports his last Repatha injection 5/19/24.    Per patient chart LDL goal less than 70.    Relevant Past Medical History and Comorbidities  Past Medical History:   Diagnosis Date    Acid reflux     Anesthesia complication     slow to wake up post CABG    Aneurysm     Borderline diabetes     resolved per pt    Cancer     Skin cancer    Coronary artery disease     History of stress test     Hyperlipidemia     Hypertension     self-monitors at home, usually WNL    Myocardial infarct 10/12/2016    Neck discomfort     RELATED TO FALL     Social History     Socioeconomic History    Marital status:     Number of children: 2   Tobacco Use    Smoking status: Every Day     Current packs/day: 1.00     Average packs/day: 1 pack/day for 60.5 years (60.5 ttl pk-yrs)     Types: Cigarettes     Start date: 1964     Passive exposure: Past    Smokeless tobacco: Never    Tobacco comments:     Down to a pack a day   Vaping Use    Vaping status: Never Used   Substance and Sexual Activity    Alcohol use: Yes     Alcohol/week: 3.0 standard drinks of alcohol     Types: 2 Cans of beer, 1 Shots of liquor  per week     Comment: 3-4 drinks/month    Drug use: No    Sexual activity: Defer       Allergies  Patient has no known allergies.    Current Medication List    Current Outpatient Medications:     aspirin 81 MG EC tablet, Take 1 tablet by mouth Daily., Disp: , Rfl:     diazePAM (VALIUM) 5 MG tablet, Take 1 tablet by mouth Every 12 (Twelve) Hours As Needed., Disp: , Rfl:     esomeprazole (NexIUM) 40 MG capsule, Take 1 capsule by mouth As Needed., Disp: , Rfl:     ezetimibe (ZETIA) 10 MG tablet, Take 1 tablet by mouth Daily. (Patient not taking: Reported on 4/19/2024), Disp: 90 tablet, Rfl: 3    Inclisiran Sodium (Leqvio) 284 MG/1.5ML solution prefilled syringe, Inject 1.5 mL under the skin into the appropriate area as directed Every 3 (Three) Months., Disp: 1.5 mL, Rfl: 1    metoprolol succinate XL (TOPROL-XL) 25 MG 24 hr tablet, Take 1 tablet by mouth Daily., Disp: 90 tablet, Rfl: 3    Naproxen Sodium (ALEVE PO), Take  by mouth As Needed., Disp: , Rfl:     rosuvastatin (CRESTOR) 20 MG tablet, Take 1 tablet by mouth Every Night. (Patient not taking: Reported on 6/17/2024), Disp: 90 tablet, Rfl: 3    Drug Interactions  No significant drug-drug interactions expected with Leqvio according to literature.     Relevant Laboratory Values      Medication Assessment & Plan    Reva, PharmD Candidate, administered Leqvio 284mg SUBQ in the back of the LEFT arm.   1st injection: 6/17/24  Medication education provided, including:   Common Adverse Effects: Injection site reactions, arthralgias, & bronchitis.  Potential for allergic reaction.  Go to ED/call 911 with any S/Sx of allergic reaction.   Must be administered by a HCP.  If a dose is missed, please call to reschedule.   Expect LDL reduction, to help reduce cardiovascular risk.   At each visit, patient will need to stay a minimum of 15 min for monitoring   Lipid panel will be checked 4 to 12 weeks after starting therapy, order placed.   Patient will continue regular  follow-up with cardiology  Will follow up in 3 months for next injection.   LDL currently at goal after having 2 doses of Juana Farrar. Ernesto, PharmD  6/17/2024  11:58 EDT

## 2024-06-17 NOTE — PROGRESS NOTES
Medication Management Clinic  Lipid Management Program - [facility]       Jose Nesbitt is a 73 y.o. male referred to the [facility]ication Management Clinic by Colt Sawyer for clinical pharmacy and specialty pharmacy management of PCSK9i.  Jose Nesbitt is  treated for clinical ASCVD/hyperlipidemia and currently takes rosuvastatin (compliance issues).  In the past, Pt has tried zetia and crestor. The patient voices complaince issues and not taking his tablets the as prescribed and has not been taking either zetia or crestor. He also experienced myalgias with statins. The patient denies any allergies to latex.      Of late, patient was most recently started on Repatha Pushtronex for 2 doses. Patient reports that he thinks that he got a little nausea after the first injection and then that he tolerated the 2nd injection well. Patient reports his last Repatha injection 5/19/24.    Per patient chart LDL goal less than 70.    Relevant Past Medical History and Comorbidities  Past Medical History:   Diagnosis Date    Acid reflux     Anesthesia complication     slow to wake up post CABG    Aneurysm     Borderline diabetes     resolved per pt    Cancer     Skin cancer    Coronary artery disease     History of stress test     Hyperlipidemia     Hypertension     self-monitors at home, usually WNL    Myocardial infarct 10/12/2016    Neck discomfort     RELATED TO FALL     Social History     Socioeconomic History    Marital status:     Number of children: 2   Tobacco Use    Smoking status: Every Day     Current packs/day: 1.00     Average packs/day: 1 pack/day for 60.5 years (60.5 ttl pk-yrs)     Types: Cigarettes     Start date: 1964     Passive exposure: Past    Smokeless tobacco: Never    Tobacco comments:     Down to a pack a day   Vaping Use    Vaping status: Never Used   Substance and Sexual Activity    Alcohol use: Yes     Alcohol/week: 3.0 standard drinks of alcohol     Types: 2 Cans of beer, 1 Shots of liquor  per week     Comment: 3-4 drinks/month    Drug use: No    Sexual activity: Defer       Allergies  Patient has no known allergies.    Current Medication List    Current Outpatient Medications:     aspirin 81 MG EC tablet, Take 1 tablet by mouth Daily., Disp: , Rfl:     diazePAM (VALIUM) 5 MG tablet, Take 1 tablet by mouth Every 12 (Twelve) Hours As Needed., Disp: , Rfl:     esomeprazole (NexIUM) 40 MG capsule, Take 1 capsule by mouth As Needed., Disp: , Rfl:     Evolocumab with Infusor (REPATHA) solution cartridge, Inject 3.5 mL under the skin into the appropriate area as directed Every 30 (Thirty) Days., Disp: 3.5 mL, Rfl: 5    ezetimibe (ZETIA) 10 MG tablet, Take 1 tablet by mouth Daily. (Patient not taking: Reported on 4/19/2024), Disp: 90 tablet, Rfl: 3    metoprolol succinate XL (TOPROL-XL) 25 MG 24 hr tablet, Take 1 tablet by mouth Daily., Disp: 90 tablet, Rfl: 3    Naproxen Sodium (ALEVE PO), Take  by mouth As Needed. (Patient not taking: Reported on 4/19/2024), Disp: , Rfl:     rosuvastatin (CRESTOR) 20 MG tablet, Take 1 tablet by mouth Every Night. (Patient taking differently: Take 1 tablet by mouth Every Night. Compliance issues), Disp: 90 tablet, Rfl: 3    Drug Interactions  No significant drug-drug interactions expected with Leqvio according to literature.     Relevant Laboratory Values      Medication Assessment & Plan    Reva, PharmD Candidate, administered Leqvio 284mg SUBQ in the back of the LEFT arm.   1st injection: 6/17/24  Medication education provided, including:   Common Adverse Effects: Injection site reactions, arthralgias, & bronchitis.  Potential for allergic reaction.  Go to ED/call 911 with any S/Sx of allergic reaction.   Must be administered by a HCP.  If a dose is missed, please call to reschedule.   Expect LDL reduction, to help reduce cardiovascular risk.   At each visit, patient will need to stay a minimum of 15 min for monitoring   Lipid panel will be checked 4 to 12 weeks after  starting therapy, order placed.   Patient will continue regular follow-up with cardiology  Will follow up in 3 months for next injection.   LDL currently at goal after having 2 doses of Juana Farrar. Ernesto, PharmD  6/17/2024  11:24 EDT

## 2024-09-09 ENCOUNTER — SPECIALTY PHARMACY (OUTPATIENT)
Dept: PHARMACY | Facility: HOSPITAL | Age: 74
End: 2024-09-09
Payer: MEDICARE

## 2024-09-16 ENCOUNTER — SPECIALTY PHARMACY (OUTPATIENT)
Dept: PHARMACY | Facility: HOSPITAL | Age: 74
End: 2024-09-16
Payer: MEDICARE

## 2024-09-16 ENCOUNTER — DISEASE STATE MANAGEMENT VISIT (OUTPATIENT)
Dept: PHARMACY | Facility: HOSPITAL | Age: 74
End: 2024-09-16
Payer: MEDICARE

## 2024-09-16 RX ORDER — INCLISIRAN 284 MG/1.5ML
284 INJECTION, SOLUTION SUBCUTANEOUS
Qty: 1.5 ML | Refills: 0 | Status: SHIPPED | OUTPATIENT
Start: 2024-09-16

## 2024-11-06 ENCOUNTER — TELEPHONE (OUTPATIENT)
Dept: CARDIOLOGY | Facility: CLINIC | Age: 74
End: 2024-11-06
Payer: MEDICARE

## 2024-11-06 DIAGNOSIS — I25.10 CORONARY ARTERY DISEASE INVOLVING NATIVE CORONARY ARTERY OF NATIVE HEART WITHOUT ANGINA PECTORIS: Primary | ICD-10-CM

## 2024-11-06 NOTE — TELEPHONE ENCOUNTER
Caller: MEHDI GARCIA    Relationship: Significant Other    Best call back number: [phone]    What orders are you requesting (i.e. lab or imaging): STRESS TEST     In what timeframe would the patient need to come in: ASAP    Where will you receive your lab/imaging services: [facility]     Additional notes: PATIENT IS NEEDING STRESS TEST IN ORDER TO RENEW HIS CD[employer] LICENSE.

## 2025-03-11 ENCOUNTER — SPECIALTY PHARMACY (OUTPATIENT)
Dept: PHARMACY | Facility: HOSPITAL | Age: 75
End: 2025-03-11

## 2025-03-11 ENCOUNTER — SPECIALTY PHARMACY (OUTPATIENT)
Dept: PHARMACY | Facility: HOSPITAL | Age: 75
End: 2025-03-11
Payer: MEDICARE

## 2025-03-11 RX ORDER — INCLISIRAN 284 MG/1.5ML
284 INJECTION, SOLUTION SUBCUTANEOUS
Qty: 1.5 ML | Refills: 0 | Status: SHIPPED | OUTPATIENT
Start: 2025-03-11

## 2025-03-11 NOTE — PROGRESS NOTES
[facility]  Lipid Management Program - Leqvio (Inclisiran)     Jose Nesbitt  is a 74 y.o. male referred by their provider, Dr. Sawyer, to the Hyperlipidemia Patient Management program offered by [facility] for Lipid Management.  Jose Nesbitt is treated for clinical ASCVD and currently takes Leqvio. Patient reports he stopped Crestor and Zetia on his own since he started Leqvio. Patient reports myalgias with statins. Pt has also previously been on Repatha 420 mg monthly but stopped due to discontinuation by .    Patient reports tolerating Leqvio well without any issues. He denies any side effects with previous dose.    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 CABG    Aneurysm     Borderline diabetes     resolved per pt    Cancer     Skin cancer    Coronary artery disease     History of stress test     Hyperlipidemia     Hypertension     self-monitors at home, usually WNL    Myocardial infarct 10/12/2016    Neck discomfort     RELATED TO FALL     Social History     Socioeconomic History    Marital status:     Number of children: 2   Tobacco Use    Smoking status: Every Day     Current packs/day: 1.00     Average packs/day: 1 pack/day for 61.2 years (61.2 ttl pk-yrs)     Types: Cigarettes     Start date: 1964     Passive exposure: Past    Smokeless tobacco: Never    Tobacco comments:     Down to a pack a day   Vaping Use    Vaping status: Never Used   Substance and Sexual Activity    Alcohol use: Yes     Alcohol/week: 3.0 standard drinks of alcohol     Types: 2 Cans of beer, 1 Shots of liquor per week     Comment: 3-4 drinks/month    Drug use: No    Sexual activity: Defer         Allergies  Known allergies and reactions were discussed with the patient. The patient's chart has been reviewed for   allergy information and updated as necessary.   No Known Allergies    Relevant Laboratory Values  Lab Results   Component Value Date    CHOL 204 (H) 10/12/2016    TRIG 147 10/12/2016    HDL 40 10/12/2016     (H) 10/12/2016           Current Medication List    Current Outpatient Medications:     aspirin 81 MG EC tablet, Take 1 tablet by mouth Daily., Disp: , Rfl:     diazePAM (VALIUM) 5 MG tablet, Take 1 tablet by mouth Every 12 (Twelve) Hours As Needed for Sleep., Disp: , Rfl:     Inclisiran Sodium (Leqvio) 284 MG/1.5ML solution prefilled syringe, Inject 1.5 mL under the skin into the appropriate area as directed Every 6 (Six) Months., Disp: 1.5 mL, Rfl: 0    metoprolol succinate XL (TOPROL-XL) 25 MG 24 hr tablet, Take 1 tablet by mouth Daily., Disp: 90 tablet, Rfl: 3         Drug Interactions  None with Leqvio    Goals of Therapy  LDL Reduction     Medication Assessment & Plan  Patient will continue Leqvio 284mg SC every 6 months.  Administered Leqvio 284mg SUBQ in back of left arm.  1st dose: 6/17/24  2nd dose: 9/16/24  3rd dose: 3/11/25  Medication education provide at initial appointment.  Most recent LDL was 60 on 9/14/24.  Patient will continue regular follow-up with cardiology  Will follow up in 6 months for next injection.     Asia Meyers, PharmD  3/11/2025  10:33 EDT

## 2025-03-11 NOTE — PROGRESS NOTES
[facility]  Lipid Management Program - Leqvio (Inclisiran)     Jose Nesbitt  is a 74 y.o. male referred by their provider, Dr. Sawyer, to the Hyperlipidemia Patient Management program offered by [facility] for Lipid Management.  Jose Nesbitt is treated for clinical ASCVD and currently takes Leqvio. Patient reports he stopped Crestor and Zetia on his own since he started Leqvio. Patient reports myalgias with statins. Pt has also previously been on Repatha 420 mg monthly but stopped due to discontinuation by .    Patient reports tolerating Leqvio well without any issues. He denies any side effects with previous dose.    Drug Coverage   [employer] sunitha    Relevant Past Medical History and Comorbidities  Past Medical History:   Diagnosis Date    Acid reflux     Anesthesia complication     slow to wake up post CABG    Aneurysm     Borderline diabetes     resolved per pt    Cancer     Skin cancer    Coronary artery disease     History of stress test     Hyperlipidemia     Hypertension     self-monitors at home, usually WNL    Myocardial infarct 10/12/2016    Neck discomfort     RELATED TO FALL     Social History     Socioeconomic History    Marital status:     Number of children: 2   Tobacco Use    Smoking status: Every Day     Current packs/day: 1.00     Average packs/day: 1 pack/day for 61.2 years (61.2 ttl pk-yrs)     Types: Cigarettes     Start date: 1962     Passive exposure: Past    Smokeless tobacco: Never    Tobacco comments:     Down to a pack a day   Vaping Use    Vaping status: Never Used   Substance and Sexual Activity    Alcohol use: Yes     Alcohol/week: 3.0 standard drinks of alcohol     Types: 2 Cans of beer, 1 Shots of liquor per week     Comment: 3-4 drinks/month    Drug use: No    Sexual activity: Defer         Allergies  Known allergies and reactions were discussed with the patient. The patient's chart has been reviewed for   allergy information and updated as necessary.   No Known Allergies    Relevant Laboratory Values  Lab Results   Component Value Date    CHOL 204 (H) 10/12/2016    TRIG 147 10/12/2016    HDL 40 10/12/2016     (H) 10/12/2016           Current Medication List    Current Outpatient Medications:     aspirin 81 MG EC tablet, Take 1 tablet by mouth Daily., Disp: , Rfl:     diazePAM (VALIUM) 5 MG tablet, Take 1 tablet by mouth Every 12 (Twelve) Hours As Needed for Sleep., Disp: , Rfl:     Inclisiran Sodium (Leqvio) 284 MG/1.5ML solution prefilled syringe, Inject 1.5 mL under the skin into the appropriate area as directed Every 6 (Six) Months., Disp: 1.5 mL, Rfl: 0    metoprolol succinate XL (TOPROL-XL) 25 MG 24 hr tablet, Take 1 tablet by mouth Daily., Disp: 90 tablet, Rfl: 3    Medicines reviewed by Asia Meyers, [place] on 3/11/2025 at 10:14 AM    Drug Interactions  None with Leqvio    Goals of Therapy  LDL Reduction     Medication Assessment & Plan  Patient will continue Leqvio 284mg SC every 6 months.  Administered Leqvio 284mg SUBQ in back of left arm.  1st dose: 6/17/24  2nd dose: 9/16/24  3rd dose: 3/11/25  Medication education provide at initial appointment.  Most recent LDL was 60 on 9/14/24.  Patient will continue regular follow-up with cardiology  Will follow up in 6 months for next injection.     Asia Meyers PharmD  3/11/2025  10:32 EDT

## (undated) DEVICE — PINNACLE INTRODUCER SHEATH: Brand: PINNACLE

## (undated) DEVICE — SUT MNCRYL PLS ANTIB UD 4/0 PS2 18IN

## (undated) DEVICE — SNAP KOVER: Brand: UNBRANDED

## (undated) DEVICE — GLV SURG PREMIERPRO MIC LTX PF SZ7 BRN

## (undated) DEVICE — 3M™ IOBAN™ 2 ANTIMICROBIAL INCISE DRAPE 6650EZ: Brand: IOBAN™ 2

## (undated) DEVICE — RADIFOCUS GLIDEWIRE ADVANTAGE GUIDEWIRE: Brand: GLIDEWIRE ADVANTAGE

## (undated) DEVICE — FEMORAL ENTRY ANGIOGRAPHY SHIELD-ORANGE: Brand: RADPAD

## (undated) DEVICE — UNDERGLV SURG BIOGEL INDICAT PI SZ8 BLU

## (undated) DEVICE — SYR LUERLOK 20CC BX/50

## (undated) DEVICE — SYR LL TP 10ML STRL

## (undated) DEVICE — LBL STRL BLANK

## (undated) DEVICE — GLV SURG BIOGEL LTX PF 7 1/2

## (undated) DEVICE — CATH SZ ACCUVU SEG/20CM OMNI .038IN 5F 70CM

## (undated) DEVICE — NDL PERC 1PRT THNWALL W/BASEPLT 18G 7CM

## (undated) DEVICE — SUCTION CANISTER 2500CC: Brand: DEROYAL

## (undated) DEVICE — DECANTER BAG 9": Brand: MEDLINE INDUSTRIES, INC.

## (undated) DEVICE — PERCLOSE™ PROSTYLE™ SUTURE-MEDIATED CLOSURE AND REPAIR SYSTEM: Brand: PERCLOSE™ PROSTYLE™

## (undated) DEVICE — ADHS SKIN PREMIERPRO EXOFIN TOPICAL HI/VISC .5ML

## (undated) DEVICE — SI AVANTI+ 7F STD W/GW  NO OBT: Brand: AVANTI

## (undated) DEVICE — INTRO SHEATH DRYSEAL FLEX 12F4TO4.7MM 33CM

## (undated) DEVICE — SYR CONTRL LUERLOK 10CC

## (undated) DEVICE — ST ACC MICROPUNCTURE .018 TRANSLSS/PLAT/TP 5F/10CM 21G/10CM

## (undated) DEVICE — NDL HYPO ECLPS SFTY 22G 1 1/2IN

## (undated) DEVICE — SYR LUERLOK 50ML

## (undated) DEVICE — RADIFOCUS GLIDEWIRE: Brand: GLIDEWIRE

## (undated) DEVICE — CVR PROB ULTRASND/TRANSD W/GEL 18X120CM STRL

## (undated) DEVICE — 3M™ STERI-DRAPE™ FLUOROSCOPE DRAPE, 10 PER CARTON / 4 CARTONS PER CASE, 1012: Brand: STERI-DRAPE™

## (undated) DEVICE — 150CC POWER INJ SYR: Brand: DEROYAL

## (undated) DEVICE — PK VASC 10

## (undated) DEVICE — INTENDED USE FOR SURGICAL MARKING ON INTACT SKIN, ALSO PROVIDES A PERMANENT METHOD OF IDENTIFYING OBJECTS IN THE OPERATING ROOM: Brand: WRITESITE® REGULAR TIP SKIN MARKER

## (undated) DEVICE — GW AMPLTZ SUPERSTIFF STR .035IN 180CM

## (undated) DEVICE — TBG INJ CONTRL EXCITE RA 1200PSI 48IN

## (undated) DEVICE — BALN OCCL MOLDING .035 10TO37MM 4X90CM